# Patient Record
Sex: FEMALE | Race: WHITE | NOT HISPANIC OR LATINO | Employment: UNEMPLOYED | URBAN - METROPOLITAN AREA
[De-identification: names, ages, dates, MRNs, and addresses within clinical notes are randomized per-mention and may not be internally consistent; named-entity substitution may affect disease eponyms.]

---

## 2019-09-03 DIAGNOSIS — Z00.00 ROUTINE GENERAL MEDICAL EXAMINATION AT A HEALTH CARE FACILITY: Primary | ICD-10-CM

## 2019-09-24 ENCOUNTER — OFFICE VISIT (OUTPATIENT)
Dept: OBSTETRICS AND GYNECOLOGY | Facility: CLINIC | Age: 54
End: 2019-09-24
Payer: COMMERCIAL

## 2019-09-24 ENCOUNTER — HOSPITAL ENCOUNTER (OUTPATIENT)
Dept: RADIOLOGY | Facility: HOSPITAL | Age: 54
Discharge: HOME | End: 2019-09-24
Attending: FAMILY MEDICINE
Payer: COMMERCIAL

## 2019-09-24 ENCOUNTER — OFFICE VISIT (OUTPATIENT)
Dept: INTERNAL MEDICINE | Facility: CLINIC | Age: 54
End: 2019-09-24
Payer: COMMERCIAL

## 2019-09-24 ENCOUNTER — CLINICAL SUPPORT (OUTPATIENT)
Dept: INTERNAL MEDICINE | Facility: CLINIC | Age: 54
End: 2019-09-24
Payer: COMMERCIAL

## 2019-09-24 ENCOUNTER — CLINICAL SUPPORT (OUTPATIENT)
Dept: CARDIOLOGY | Facility: CLINIC | Age: 54
End: 2019-09-24
Payer: COMMERCIAL

## 2019-09-24 VITALS
SYSTOLIC BLOOD PRESSURE: 98 MMHG | HEIGHT: 62 IN | DIASTOLIC BLOOD PRESSURE: 76 MMHG | BODY MASS INDEX: 22.11 KG/M2 | WEIGHT: 120.13 LBS

## 2019-09-24 VITALS — BODY MASS INDEX: 22.11 KG/M2 | HEIGHT: 62 IN | WEIGHT: 120.13 LBS

## 2019-09-24 VITALS
TEMPERATURE: 97 F | HEART RATE: 62 BPM | DIASTOLIC BLOOD PRESSURE: 73 MMHG | BODY MASS INDEX: 21.91 KG/M2 | HEIGHT: 62 IN | RESPIRATION RATE: 14 BRPM | WEIGHT: 119.06 LBS | SYSTOLIC BLOOD PRESSURE: 105 MMHG

## 2019-09-24 DIAGNOSIS — E55.9 VITAMIN D DEFICIENCY: ICD-10-CM

## 2019-09-24 DIAGNOSIS — Z00.00 ROUTINE GENERAL MEDICAL EXAMINATION AT A HEALTH CARE FACILITY: ICD-10-CM

## 2019-09-24 DIAGNOSIS — Z01.419 ENCOUNTER FOR GYNECOLOGICAL EXAMINATION WITHOUT ABNORMAL FINDING: Primary | ICD-10-CM

## 2019-09-24 DIAGNOSIS — Z12.11 COLON CANCER SCREENING: ICD-10-CM

## 2019-09-24 DIAGNOSIS — Z00.00 ENCOUNTER FOR WELLNESS EXAMINATION: Primary | ICD-10-CM

## 2019-09-24 DIAGNOSIS — Z00.00 ROUTINE GENERAL MEDICAL EXAMINATION AT A HEALTH CARE FACILITY: Primary | ICD-10-CM

## 2019-09-24 DIAGNOSIS — L98.9 LESION OF SKIN OF FACE: ICD-10-CM

## 2019-09-24 LAB
ALBUMIN SERPL BCP-MCNC: 4 G/DL (ref 3.5–5.2)
ALP SERPL-CCNC: 57 U/L (ref 55–135)
ALT SERPL W/O P-5'-P-CCNC: 19 U/L (ref 10–44)
ANION GAP SERPL CALC-SCNC: 9 MMOL/L (ref 8–16)
AST SERPL-CCNC: 16 U/L (ref 10–40)
BILIRUB SERPL-MCNC: 0.6 MG/DL (ref 0.1–1)
BUN SERPL-MCNC: 14 MG/DL (ref 6–20)
CALCIUM SERPL-MCNC: 9.6 MG/DL (ref 8.7–10.5)
CHLORIDE SERPL-SCNC: 106 MMOL/L (ref 95–110)
CHOLEST SERPL-MCNC: 224 MG/DL (ref 120–199)
CHOLEST/HDLC SERPL: 3.6 {RATIO} (ref 2–5)
CO2 SERPL-SCNC: 26 MMOL/L (ref 23–29)
CREAT SERPL-MCNC: 0.7 MG/DL (ref 0.5–1.4)
ERYTHROCYTE [DISTWIDTH] IN BLOOD BY AUTOMATED COUNT: 12.4 % (ref 11.5–14.5)
EST. GFR  (AFRICAN AMERICAN): >60 ML/MIN/1.73 M^2
EST. GFR  (NON AFRICAN AMERICAN): >60 ML/MIN/1.73 M^2
GLUCOSE SERPL-MCNC: 79 MG/DL (ref 70–110)
HCT VFR BLD AUTO: 41 % (ref 37–48.5)
HDLC SERPL-MCNC: 62 MG/DL (ref 40–75)
HDLC SERPL: 27.7 % (ref 20–50)
HGB BLD-MCNC: 14.2 G/DL (ref 12–16)
LDLC SERPL CALC-MCNC: 146.2 MG/DL (ref 63–159)
MCH RBC QN AUTO: 33.2 PG (ref 27–31)
MCHC RBC AUTO-ENTMCNC: 34.6 G/DL (ref 32–36)
MCV RBC AUTO: 96 FL (ref 82–98)
NONHDLC SERPL-MCNC: 162 MG/DL
PLATELET # BLD AUTO: 264 K/UL (ref 150–350)
PMV BLD AUTO: 9.6 FL (ref 9.2–12.9)
POTASSIUM SERPL-SCNC: 3.8 MMOL/L (ref 3.5–5.1)
PROT SERPL-MCNC: 6.9 G/DL (ref 6–8.4)
RBC # BLD AUTO: 4.28 M/UL (ref 4–5.4)
SODIUM SERPL-SCNC: 141 MMOL/L (ref 136–145)
TRIGL SERPL-MCNC: 79 MG/DL (ref 30–150)
WBC # BLD AUTO: 4.59 K/UL (ref 3.9–12.7)

## 2019-09-24 PROCEDURE — 80061 LIPID PANEL: CPT

## 2019-09-24 PROCEDURE — 99386 PREV VISIT NEW AGE 40-64: CPT | Mod: S$PBB,,, | Performed by: NURSE PRACTITIONER

## 2019-09-24 PROCEDURE — 77067 SCR MAMMO BI INCL CAD: CPT | Mod: 26,,, | Performed by: RADIOLOGY

## 2019-09-24 PROCEDURE — 99999 PR PBB SHADOW E&M-EST. PATIENT-LVL II: ICD-10-PCS | Mod: PBBFAC,,, | Performed by: NURSE PRACTITIONER

## 2019-09-24 PROCEDURE — 71046 X-RAY EXAM CHEST 2 VIEWS: CPT | Mod: TC

## 2019-09-24 PROCEDURE — 71046 X-RAY EXAM CHEST 2 VIEWS: CPT | Mod: 26,,, | Performed by: RADIOLOGY

## 2019-09-24 PROCEDURE — 77063 MAMMO DIGITAL SCREENING BILAT WITH TOMOSYNTHESIS_CAD: ICD-10-PCS | Mod: 26,,, | Performed by: RADIOLOGY

## 2019-09-24 PROCEDURE — 77067 MAMMO DIGITAL SCREENING BILAT WITH TOMOSYNTHESIS_CAD: ICD-10-PCS | Mod: 26,,, | Performed by: RADIOLOGY

## 2019-09-24 PROCEDURE — 93010 ELECTROCARDIOGRAM REPORT: CPT | Mod: S$PBB,,, | Performed by: INTERNAL MEDICINE

## 2019-09-24 PROCEDURE — 99999 PR PBB SHADOW E&M-EST. PATIENT-LVL III: ICD-10-PCS | Mod: PBBFAC,,, | Performed by: FAMILY MEDICINE

## 2019-09-24 PROCEDURE — 71046 XR CHEST PA AND LATERAL: ICD-10-PCS | Mod: 26,,, | Performed by: RADIOLOGY

## 2019-09-24 PROCEDURE — 99999 PR PBB SHADOW E&M-EST. PATIENT-LVL II: CPT | Mod: PBBFAC,,, | Performed by: NURSE PRACTITIONER

## 2019-09-24 PROCEDURE — 99396 PREV VISIT EST AGE 40-64: CPT | Mod: S$GLB,,, | Performed by: FAMILY MEDICINE

## 2019-09-24 PROCEDURE — 93010 EKG 12-LEAD: ICD-10-PCS | Mod: S$PBB,,, | Performed by: INTERNAL MEDICINE

## 2019-09-24 PROCEDURE — 77063 BREAST TOMOSYNTHESIS BI: CPT | Mod: 26,,, | Performed by: RADIOLOGY

## 2019-09-24 PROCEDURE — 88175 CYTOPATH C/V AUTO FLUID REDO: CPT

## 2019-09-24 PROCEDURE — 99999 PR PBB SHADOW E&M-EST. PATIENT-LVL III: CPT | Mod: PBBFAC,,, | Performed by: FAMILY MEDICINE

## 2019-09-24 PROCEDURE — 85027 COMPLETE CBC AUTOMATED: CPT

## 2019-09-24 PROCEDURE — 99396 PR PREVENTIVE VISIT,EST,40-64: ICD-10-PCS | Mod: S$GLB,,, | Performed by: FAMILY MEDICINE

## 2019-09-24 PROCEDURE — 77067 SCR MAMMO BI INCL CAD: CPT | Mod: TC

## 2019-09-24 PROCEDURE — 80053 COMPREHEN METABOLIC PANEL: CPT

## 2019-09-24 PROCEDURE — 93005 ELECTROCARDIOGRAM TRACING: CPT | Mod: PBBFAC | Performed by: INTERNAL MEDICINE

## 2019-09-24 PROCEDURE — 99386 PR PREVENTIVE VISIT,NEW,40-64: ICD-10-PCS | Mod: S$PBB,,, | Performed by: NURSE PRACTITIONER

## 2019-09-24 NOTE — PATIENT INSTRUCTIONS
The Range of Pap Test Results  When your Pap test is sent to the lab, the lab studies your cell samples and reports any abnormal cell changes. Your health care provider can discuss these changes with you. In some cases, an abnormal Pap test is due to an infection. More serious cell changes range from dysplasia to cancer. Talk to your health care provider about your Pap test.    Normal results  Cervical cells, even normal ones, are always changing. As they mature, normal squamous cells move from deeper layers within the cervix. Over time, these cells flatten and cover the surface of the cervix. Within the cervical canal, the cells are different. These glandular cells are taller and not as flat as the cells on the surface of the cervix. When a Pap test sample shows healthy cells of both types, the results are negative. Keep having Pap tests as often as directed.  Abnormal results  A positive Pap test result means some cells in the sample showed abnormal changes. These results are grouped by the type of cell change and the location, or extent, of the changes. Depending on the results, you may need further testing.  · Inflammation: Noncancerous changes are present. They may be due to normal cell repair. Or, they may be caused by an infection, such as HPV or yeast. Further testing may be needed. (Also called reactive cellular changes.)  · Atypical squamous cells: Test results are unclear. Cells on the surface of the cervix show changes, but their significance is not yet known. Testing for HPV and other sexually transmitted infections (STIs) may be needed. Treatment may be required. (Reported as ASC-US or ASC-H.)  · Atypical glandular cells: Cells lining the cervical canal show abnormal changes. Further testing is likely. You may also have treatment to destroy or remove problem cells. (Reported as AGC.)  · Mild dysplasia: Cells show distinct changes. More testing or HPV typing may be done. You may also have treatment to  destroy or remove problem cells. (Reported as low-grade SUKH or ALOK 1.)  · Moderate to severe dysplasia: Cells show precancerous changes. Or, noninvasive cancer (carcinoma in situ) may be present. Treatment to destroy or remove problem cells is likely. (Reported as high-grade SUKH or ALOK 2 or ALOK 3.)  · Cancer: Different types of cancer may be detected by your Pap test. More tests to assess the cancer's extent are likely. The type of treatment will depend on the test results and other factors, such as age and health history. (Reported as squamous cell carcinoma, endocervical adenocarcinoma in situ, or adenocarcinoma.)  Date Last Reviewed: 5/12/2015  © 5354-5042 [employer]. [address] [place] [address], [place] [address]. All rights reserved. This information is not intended as a substitute for professional medical care. Always follow your healthcare professional's instructions.        Breast Health: Breast Self-Awareness  What is breast self-awareness?  Breast self-awareness is knowing how your breasts normally look and feel. Your breasts change as you go through different stages of your life. So its important to learn what is normal for your breasts. Breast self-awareness helps you notice any changes in your breasts right away. Report any changes to your healthcare provider.  Why is breast self-awareness important?  Many experts now say that women should focus on breast self-awareness instead of doing a breast self-examination (BSE). These experts include the American Cancer Society, the U.S. Preventive Services Task Force, and the American Congress of Obstetricians and Gynecologists. Some experts even advise not teaching women to do a BSE. Thats because research hasnt shown a clear benefit to doing BSEs.  Breast self-awareness is different than a BSE. Breast self-awareness isnt about following a certain method and schedule. Its about knowing what's normal for your breasts. That way you can notice even  small changes right away. If you see any changes, report them to your healthcare provider.  Changes to look for  Call your healthcare provider if you find any changes in your breasts that concern you. These changes may include:  · A lump  · Nipple discharge other than breast milk, especially a bloody discharge  · Swelling  · A change in size or shape  · Skin irritation, such as redness, thickening, or dimpling of the skin  · Swollen lymph nodes in the armpit  · Nipple problems, such as pain or redness  If you find a lump  Contact your provider if you find lumpiness in one breast, feel something different in the tissue, or feel a definite lump. Sometimes lumpiness may be due to menstrual changes. But there may be reason for concern.  Your provider may want to see you right away if you have:  · Nipple discharge that is bloody  · Skin changes on your breast, such as dimpling or puckering  Its normal to be upset if you find a lump. But its important to contact your provider right away. Remember that most breast lumps are benign. This means they are not cancer.  Date Last Reviewed: 8/10/2015  © 6893-5758 [employer]. [address] [place] [address], [place] [address]. All rights reserved. This information is not intended as a substitute for professional medical care. Always follow your healthcare professional's instructions.

## 2019-09-24 NOTE — PATIENT INSTRUCTIONS
General Guidelines for Healthy Bones    Eat a healthy diet.     Exercise regularly.     Do not smoke.     Avoid excess alcohol and caffeine.     Practice fall prevention.   Eat a Healthy Diet   Good nutrition is essential for normal growth. A balanced diet, adequate calories, and appropriate nutrients are the foundation for development of all your tissues, including bone. Inadequate intake of calcium and vitamin D is thought to contribute to the development and progression of osteoporosis.   Calcium   Calcium is the nutrient most important for attaining peak bone mass and for preventing and treating osteoporosis. Depending on your age, recommended calcium intakes for adults fall between 1,000 and 1,200 mg per day. The majority of children, adolescents, and adults do not meet adequate calcium requirements.    You can increase your calcium intake by eating more calcium-rich foods such as low-fat milk, yogurt, cheese, sardines, soy products, and brit greens. Many foods such as orange juice, breakfast bars, and cereals are now fortified with calcium. If you are unable to get adequate calcium in your diet, talk to your healthcare provider or a registered dietitian (RD) about a supplement.    High dietary protein, caffeine, and sodium can adversely affect calcium balance, but the effects may not be as important in individuals with adequate calcium intakes.  Vitamin D   Vitamin D is required for optimal calcium absorption and bone health. Your skin manufactures vitamin D in response to direct exposure to sunlight. How much sunlight you need depends on where you live, the time of day, and the color of your skin.    Older adults and people who have little exposure to sunlight may have difficulty meeting vitamin D requirements. Most infants and young children in the [place] have adequate vitamin D intake because of the fortification of milk. During adolescence, when consumption of dairy products decreases,  vitamin D intake may be inadequate, and this may adversely affect calcium absorption.    Experts recommend a daily intake of between 600 and 1,000 international units (IU) of vitamin D.  Exercise Regularly   There is strong evidence that regular physical activity in childhood and adolescence contributes to higher peak bone mass. Exercise during later years, combined with adequate calcium and vitamin D intake, may help slow the decline in bone density associated with aging. Some evidence indicates that weight-bearing and resistance exercises are most likely to be beneficial.    To help reduce bone loss, engage in regular weight-bearing exercise. Examples include walking, running, tennis, dancing, hiking, and racquetball. Strength training can also help increase your bone density and keep your muscles strong, so that you are less likely to fall.  Do Not Smoke   Smokers have a higher risk of osteoporosis. A study reported in the Journal of the American Academy of Orthopedics suggests that smoking puts you at higher risk for developing osteoporosis.   Avoid Excessive Alcohol and Caffeine   Alcohol has several negative affects on bones. Alcohol can interfere with vitamin D metabolism, which results in impaired calcium absorption. It also increases magnesium excretion. In addition, heavy drinkers tend to have diets that are lacking in many key nutrients, including calcium and vitamin D.    Heavy drinking can affect hormone production in both women and men. In premenopausal women, chronic alcohol use can result in irregular menstrual cycles. This increases the risk of osteoporosis. Testosterone production may be affected in alcoholic men. Low testosterone levels have been linked to a decrease in bone formation.    Heavy alcohol consumption is associated with an increased risk of fracture. Alcohol affects your balance and gait, making you more prone to stumble, trip, or run into objects.    The relationship between caffeine  use and bone health is not as clear-cut. Studies on the effect of caffeine on the bones have yielded mixed results. On the basis of the studies to date, current recommendations are for moderate caffeine consumption, which equals two or less cups of coffee per day.  Practice Fall Prevention   Fall prevention is always important, but especially for people with osteoporosis. Osteoporotic bones are fragile and, therefore, more easily broken. Most falls happen in the home. The National Osteoporosis Foundation makes the following recommendations to help make your home safe:   Floors-Remove all loose wires, cords, and throw rugs. Minimize clutter. Make sure rugs are anchored and smooth. Keep furniture in its accustomed place.   Bathrooms-Install grab bars and non-skid tape in the tub or shower.   Lighting-Make sure halls, stairways, and entrances are well lit. Install a night light in your bathroom. Turn lights on if you get up in the middle of the night.   Kitchen-Install non-skid rubber mats near sink and stove. Clean spills immediately.   Stairs-Make sure treads, rails, and rugs are secure.   Other precautions-Wear sturdy, rubber-soled shoes. Keep your intake of alcoholic beverages to a minimum. Ask your doctor whether any of your medications might cause you to fall.

## 2019-09-24 NOTE — PROGRESS NOTES
Subjective:       Patient ID: Jessica Walker is a 54 y.o. female.    Chief Complaint: Executive Health    55 yo female here for executive health physical. +h/o otosclerosis with stapesectomy--looking for an otologist. She has noticed a red spot on her face.     +FH breast cancer in mid-70s    Pap: s/p hysterectomy  Cscope: none recent  MMG: today  Flu: declines  Tdap: believes about 10 years ago      does not have a problem list on file.  No past medical history on file.  Past Surgical History:   Procedure Laterality Date    HYSTERECTOMY  2009    large fibroid, retains ovaries     INNER EAR SURGERY Bilateral      Family History   Problem Relation Age of Onset    Heart attack Father     Breast cancer Mother      Social History     Socioeconomic History    Marital status:      Spouse name: Not on file    Number of children: Not on file    Years of education: Not on file    Highest education level: Not on file   Occupational History    Not on file   Social Needs    Financial resource strain: Not on file    Food insecurity:     Worry: Not on file     Inability: Not on file    Transportation needs:     Medical: Not on file     Non-medical: Not on file   Tobacco Use    Smoking status: Never Smoker    Smokeless tobacco: Never Used   Substance and Sexual Activity    Alcohol use: Not Currently    Drug use: Never    Sexual activity: Yes     Partners: Male   Lifestyle    Physical activity:     Days per week: Not on file     Minutes per session: Not on file    Stress: Not on file   Relationships    Social connections:     Talks on phone: Not on file     Gets together: Not on file     Attends Scientologist service: Not on file     Active member of club or organization: Not on file     Attends meetings of clubs or organizations: Not on file     Relationship status: Not on file   Other Topics Concern    Not on file   Social History Narrative    Not on file     Review of Systems   Constitutional: Negative for  fatigue and unexpected weight change.   HENT: Positive for hearing loss. Negative for sore throat.    Eyes: Negative for pain and visual disturbance.   Respiratory: Negative for cough and shortness of breath.    Cardiovascular: Negative for chest pain and palpitations.   Gastrointestinal: Negative for abdominal pain, constipation and diarrhea.   Genitourinary: Negative for difficulty urinating, dysuria and vaginal discharge.   Musculoskeletal: Negative for arthralgias and myalgias.   Skin: Negative for rash and wound.   Neurological: Negative for light-headedness and headaches.   Hematological: Negative.        Objective:     Vitals:    09/24/19 1058   BP: 105/73   Pulse: 62   Resp: 14   Temp: 96.8 °F (36 °C)        Physical Exam   Constitutional: She is oriented to person, place, and time. She appears well-developed and well-nourished.   HENT:   Head: Normocephalic and atraumatic.   Eyes: Pupils are equal, round, and reactive to light. EOM are normal.   Neck: Normal range of motion. Neck supple.   Cardiovascular: Normal rate and regular rhythm.   Pulmonary/Chest: Effort normal and breath sounds normal.   Musculoskeletal: Normal range of motion. She exhibits no deformity.   Neurological: She is alert and oriented to person, place, and time.   Skin: Skin is warm and dry.   Psychiatric: She has a normal mood and affect. Her behavior is normal.   Nursing note and vitals reviewed.      Assessment:       1. Encounter for wellness examination    2. Colon cancer screening    3. Lesion of skin of face    4. Vitamin D deficiency        Plan:           Problem List Items Addressed This Visit     None      Visit Diagnoses     Encounter for wellness examination    -  Primary    Colon cancer screening        Relevant Orders    Case request GI: COLONOSCOPY (Completed)    Lesion of skin of face        Relevant Orders    Ambulatory Referral to Dermatology    Vitamin D deficiency        Relevant Orders    Vitamin D

## 2019-09-24 NOTE — PROGRESS NOTES
"CC: Well woman exam    Jessica Walker is a 54 y.o. female No obstetric history on file. presents for a well woman exam.  No issues, problems, or complaints.    MMG and pcp appt today.     History reviewed. No pertinent past medical history.  Past Surgical History:   Procedure Laterality Date    HYSTERECTOMY  2009    large fibroid, retains ovaries     INNER EAR SURGERY Bilateral      Family History   Problem Relation Age of Onset    Heart attack Father     Breast cancer Mother      Social History     Tobacco Use    Smoking status: Never Smoker    Smokeless tobacco: Never Used   Substance Use Topics    Alcohol use: Not Currently    Drug use: Never     OB History    None         BP 98/76   Ht 5' 2" (1.575 m)   Wt 54.5 kg (120 lb 2.4 oz)   BMI 21.98 kg/m²     ROS:  GENERAL: Denies weight gain or weight loss. Feeling well overall.   SKIN: Denies rash or lesions.   HEAD: Denies head injury or headache.   NODES: Denies enlarged lymph nodes.   CHEST: Denies chest pain or shortness of breath.   CARDIOVASCULAR: Denies palpitations or left sided chest pain.   ABDOMEN: No abdominal pain, constipation, diarrhea, nausea, vomiting or rectal bleeding.   URINARY: No frequency, dysuria, hematuria, or burning on urination.  REPRODUCTIVE: See HPI.   BREASTS: The patient performs breast self-examination and denies pain, lumps, or nipple discharge.   HEMATOLOGIC: No easy bruisability or excessive bleeding.   MUSCULOSKELETAL: Denies joint pain or swelling.   NEUROLOGIC: Denies syncope or weakness.   PSYCHIATRIC: Denies depression, anxiety or mood swings.    PE:   APPEARANCE: Well nourished, well developed, in no acute distress.  AFFECT: WNL, alert and oriented x 3.  SKIN: No acne or hirsutism.  NECK: Neck symmetric without masses or thyromegaly.  NODES: No inguinal, cervical, axillary or femoral lymph node enlargement.  CHEST: Good respiratory effort.   ABDOMEN: Soft. No tenderness or masses. No hepatosplenomegaly. No " hernias.  BREASTS: Symmetrical, no skin changes or visible lesions. No palpable masses, nipple discharge bilaterally.  PELVIC: Normal external female genitalia without lesions. Normal hair distribution. Adequate perineal body, normal urethral meatus. Vagina atrophic without lesions or discharge. No significant cystocele or rectocele. Bimanual exam shows uterus and cervix to be surgically absent - mild prolapse of midline vaginal wall - pap taken of cuff. Adnexa without masses or tenderness.  .    1. Encounter for gynecological examination without abnormal finding  Liquid-based pap smear, screening    and PLAN:    Patient was counseled today on A.C.S. Pap guidelines and recommendations for yearly pelvic exams, mammograms and monthly self breast exams; to see her PCP for other health maintenance.

## 2019-09-25 ENCOUNTER — PATIENT MESSAGE (OUTPATIENT)
Dept: INTERNAL MEDICINE | Facility: CLINIC | Age: 54
End: 2019-09-25

## 2019-09-26 ENCOUNTER — TELEPHONE (OUTPATIENT)
Dept: ENDOSCOPY | Facility: HOSPITAL | Age: 54
End: 2019-09-26

## 2019-09-26 NOTE — TELEPHONE ENCOUNTER
Called pt to schedule her colonoscopy. She refused at this time and will call back once she looks at her schedule.

## 2019-10-01 ENCOUNTER — PATIENT MESSAGE (OUTPATIENT)
Dept: OBSTETRICS AND GYNECOLOGY | Facility: CLINIC | Age: 54
End: 2019-10-01

## 2019-10-01 ENCOUNTER — INITIAL CONSULT (OUTPATIENT)
Dept: DERMATOLOGY | Facility: CLINIC | Age: 54
End: 2019-10-01
Payer: COMMERCIAL

## 2019-10-01 DIAGNOSIS — D22.9 MULTIPLE BENIGN NEVI: ICD-10-CM

## 2019-10-01 DIAGNOSIS — L72.0 MILIA: ICD-10-CM

## 2019-10-01 DIAGNOSIS — L81.4 LENTIGO: ICD-10-CM

## 2019-10-01 DIAGNOSIS — D18.00 ANGIOMA: Primary | ICD-10-CM

## 2019-10-01 PROCEDURE — 99999 PR PBB SHADOW E&M-EST. PATIENT-LVL II: ICD-10-PCS | Mod: PBBFAC,,, | Performed by: STUDENT IN AN ORGANIZED HEALTH CARE EDUCATION/TRAINING PROGRAM

## 2019-10-01 PROCEDURE — 99202 PR OFFICE/OUTPT VISIT, NEW, LEVL II, 15-29 MIN: ICD-10-PCS | Mod: S$GLB,,, | Performed by: STUDENT IN AN ORGANIZED HEALTH CARE EDUCATION/TRAINING PROGRAM

## 2019-10-01 PROCEDURE — 99999 PR PBB SHADOW E&M-EST. PATIENT-LVL II: CPT | Mod: PBBFAC,,, | Performed by: STUDENT IN AN ORGANIZED HEALTH CARE EDUCATION/TRAINING PROGRAM

## 2019-10-01 PROCEDURE — 99202 OFFICE O/P NEW SF 15 MIN: CPT | Mod: S$GLB,,, | Performed by: STUDENT IN AN ORGANIZED HEALTH CARE EDUCATION/TRAINING PROGRAM

## 2019-10-01 NOTE — LETTER
October 1, 2019      Radha Garcia MD  [address] [facility] [place] [address]           [facility]  [address] [facility] [place] [address]  Phone: [phone]  Fax: [phone]          Patient: Jessica Walker   MR Number: [id number]   YOB: 1965   Date of Visit: 10/1/2019       Dear Dr. Radha Garcia:    Thank you for referring Jessica Walker to me for evaluation. Attached you will find relevant portions of my assessment and plan of care.    If you have questions, please do not hesitate to call me. I look forward to following Jessica Walker along with you.    Sincerely,    Herrrea Roe MD    Enclosure  CC:  No Recipients    If you would like to receive this communication electronically, please contact [email or web address] or [phone] to request more information on [employer] Link access.    For providers and/or their staff who would like to refer a patient to [facility], please contact us through our one-stop-shop provider referral line, [facility], at [phone].    If you feel you have received this communication in error or would no longer like to receive these types of communications, please e-mail [email or web address]

## 2019-10-01 NOTE — PROGRESS NOTES
Subjective:       Patient ID:  Jessica Walker is a 54 y.o. female who presents for   Chief Complaint   Patient presents with    Lesion     right cheek been here for 8 months not using anything     Skin Tags     right axillary been here for 6 months not using naything      HPI    Review of Systems     Objective:    Physical Exam   Constitutional: She appears well-developed and well-nourished. No distress.   Neurological: She is alert and oriented to person, place, and time. She is not disoriented.   Psychiatric: She has a normal mood and affect.   Skin:   Areas Examined (abnormalities noted in diagram):   Head / Face Inspection Performed  Neck Inspection Performed  Chest / Axilla Inspection Performed  Abdomen Inspection Performed  Back Inspection Performed  RUE Inspected  LUE Inspection Performed                       Diagram Legend     Erythematous scaling macule/papule c/w actinic keratosis       Vascular papule c/w angioma      Pigmented verrucoid papule/plaque c/w seborrheic keratosis      Yellow umbilicated papule c/w sebaceous hyperplasia      Irregularly shaped tan macule c/w lentigo     1-2 mm smooth white papules consistent with Milia      Movable subcutaneous cyst with punctum c/w epidermal inclusion cyst      Subcutaneous movable cyst c/w pilar cyst      Firm pink to brown papule c/w dermatofibroma      Pedunculated fleshy papule(s) c/w skin tag(s)      Evenly pigmented macule c/w junctional nevus     Mildly variegated pigmented, slightly irregular-bordered macule c/w mildly atypical nevus      Flesh colored to evenly pigmented papule c/w intradermal nevus       Pink pearly papule/plaque c/w basal cell carcinoma      Erythematous hyperkeratotic cursted plaque c/w SCC      Surgical scar with no sign of skin cancer recurrence      Open and closed comedones      Inflammatory papules and pustules      Verrucoid papule consistent consistent with wart     Erythematous eczematous patches and plaques      Dystrophic onycholytic nail with subungual debris c/w onychomycosis     Umbilicated papule    Erythematous-base heme-crusted tan verrucoid plaque consistent with inflamed seborrheic keratosis     Erythematous Silvery Scaling Plaque c/w Psoriasis     See annotation      Assessment / Plan:        Angioma  This is a benign vascular lesion. Reassurance given. No treatment required.     Multiple benign nevi  upper body skin examination performed today including at least 6 points as noted in physical examination. No lesions suspicious for malignancy noted.  Reassurance provided.  Instructed patient to observe lesion(s) for changes and follow up in clinic if changes are noted. Discussed ABCDE's of moles and brochure provided.    Milia  Reassurance given to patient. No treatment is necessary.     Lentigo  This is a benign hyperpigmented sun induced lesion. Daily sun protection will reduce the number of new lesions. Treatment of these benign lesions are considered cosmetic.             Follow up in about 1 year (around 10/1/2020).

## 2019-10-10 ENCOUNTER — PATIENT MESSAGE (OUTPATIENT)
Dept: INTERNAL MEDICINE | Facility: CLINIC | Age: 54
End: 2019-10-10

## 2019-10-10 ENCOUNTER — PATIENT MESSAGE (OUTPATIENT)
Dept: OBSTETRICS AND GYNECOLOGY | Facility: CLINIC | Age: 54
End: 2019-10-10

## 2019-10-24 DIAGNOSIS — Z91.89 INCREASED RISK OF BREAST CANCER: Primary | ICD-10-CM

## 2019-11-01 ENCOUNTER — PATIENT MESSAGE (OUTPATIENT)
Dept: SURGERY | Facility: CLINIC | Age: 54
End: 2019-11-01

## 2019-11-11 ENCOUNTER — PATIENT MESSAGE (OUTPATIENT)
Dept: INTERNAL MEDICINE | Facility: CLINIC | Age: 54
End: 2019-11-11

## 2019-12-02 ENCOUNTER — HOSPITAL ENCOUNTER (OUTPATIENT)
Dept: RADIOLOGY | Facility: HOSPITAL | Age: 54
Discharge: HOME | End: 2019-12-02
Attending: NURSE PRACTITIONER
Payer: COMMERCIAL

## 2019-12-02 ENCOUNTER — OFFICE VISIT (OUTPATIENT)
Dept: SURGERY | Facility: CLINIC | Age: 54
End: 2019-12-02
Payer: COMMERCIAL

## 2019-12-02 VITALS
BODY MASS INDEX: 23.27 KG/M2 | DIASTOLIC BLOOD PRESSURE: 68 MMHG | WEIGHT: 127.19 LBS | HEART RATE: 68 BPM | SYSTOLIC BLOOD PRESSURE: 107 MMHG | TEMPERATURE: 98 F

## 2019-12-02 DIAGNOSIS — G89.29 CHRONIC HEEL PAIN, LEFT: ICD-10-CM

## 2019-12-02 DIAGNOSIS — Z80.3 FAMILY HISTORY OF BREAST CANCER: ICD-10-CM

## 2019-12-02 DIAGNOSIS — M79.672 CHRONIC HEEL PAIN, LEFT: ICD-10-CM

## 2019-12-02 DIAGNOSIS — Z71.89 COUNSELING ON HEALTH PROMOTION AND DISEASE PREVENTION: ICD-10-CM

## 2019-12-02 DIAGNOSIS — Z12.11 COLON CANCER SCREENING: ICD-10-CM

## 2019-12-02 DIAGNOSIS — Z71.89 COUNSELING AND COORDINATION OF CARE: ICD-10-CM

## 2019-12-02 DIAGNOSIS — Z91.89 AT HIGH RISK FOR BREAST CANCER: Primary | ICD-10-CM

## 2019-12-02 DIAGNOSIS — Z80.3 FAMILY HISTORY OF BREAST CANCER IN MOTHER: ICD-10-CM

## 2019-12-02 PROCEDURE — 73630 X-RAY EXAM OF FOOT: CPT | Mod: TC,LT

## 2019-12-02 PROCEDURE — 99999 PR PBB SHADOW E&M-EST. PATIENT-LVL III: ICD-10-PCS | Mod: PBBFAC,,, | Performed by: NURSE PRACTITIONER

## 2019-12-02 PROCEDURE — 73630 X-RAY EXAM OF FOOT: CPT | Mod: 26,LT,, | Performed by: RADIOLOGY

## 2019-12-02 PROCEDURE — 99205 PR OFFICE/OUTPT VISIT, NEW, LEVL V, 60-74 MIN: ICD-10-PCS | Mod: S$GLB,,, | Performed by: NURSE PRACTITIONER

## 2019-12-02 PROCEDURE — 73630 XR FOOT COMPLETE 3 VIEW LEFT: ICD-10-PCS | Mod: 26,LT,, | Performed by: RADIOLOGY

## 2019-12-02 PROCEDURE — 99205 OFFICE O/P NEW HI 60 MIN: CPT | Mod: S$GLB,,, | Performed by: NURSE PRACTITIONER

## 2019-12-02 PROCEDURE — 99999 PR PBB SHADOW E&M-EST. PATIENT-LVL III: CPT | Mod: PBBFAC,,, | Performed by: NURSE PRACTITIONER

## 2019-12-02 RX ORDER — DIAZEPAM 5 MG/1
5 TABLET ORAL EVERY 6 HOURS PRN
COMMUNITY
End: 2020-03-11

## 2019-12-02 RX ORDER — MAGNESIUM 30 MG
TABLET ORAL ONCE
COMMUNITY

## 2019-12-02 NOTE — LETTER
December 2, 2019      Radha Garcia MD  [address]  [place] [address]           [facility]  [address] [place] [address]  Phone: [phone]  Fax: [phone]          Patient: Jessica Walker   MR Number: [id number]   YOB: 1965   Date of Visit: 12/2/2019       Dear Dr. Radha Garcia:    Thank you for referring Jessica Walker to me for evaluation. Attached you will find relevant portions of my assessment and plan of care.    If you have questions, please do not hesitate to call me. I look forward to following Jessica Walker along with you.    Sincerely,    Silvana Mcintosh NP    Enclosure  CC:  No Recipients    If you would like to receive this communication electronically, please contact [email or web address] or [phone] to request more information on [employer] Link access.    For providers and/or their staff who would like to refer a patient to [facility], please contact us through our one-stop-shop provider referral line, [facility], at [phone].    If you feel you have received this communication in error or would no longer like to receive these types of communications, please e-mail [email or web address]

## 2019-12-02 NOTE — PROGRESS NOTES
Patient ID: Jessica Walker is a 54 y.o. female.    Chief Complaint: High risk for breast cancer      HPI:patient presents for the evaluation of an elevated breast cancer risk assessment score of 20.9%    Risk factors identified:     Menarche at 16 y/o  G  0 P 0  First pregnancy at none  LMP: hyst 43 y/o  Estrogen:none  Radiation to the neck or chest wall- none  Prior breast biopsies or atypical hyperplasia- none      ETOH: none     FH: mother at 74 y/o breast cancer- bilateral- no other cancers- did not do genetic testing     Body mass index is 23.27 kg/m².    Review of Systems   Constitutional: Negative.  Negative for activity change, appetite change, chills, diaphoresis, fatigue, fever and unexpected weight change.   HENT: Negative.  Negative for congestion, ear pain, postnasal drip, rhinorrhea, sinus pressure, sneezing, sore throat and trouble swallowing.    Eyes: Negative.  Negative for visual disturbance.   Respiratory: Negative.  Negative for apnea, cough, chest tightness, shortness of breath and wheezing.    Cardiovascular: Negative.  Negative for chest pain, palpitations and leg swelling.   Gastrointestinal: Negative.  Negative for abdominal distention, abdominal pain, anal bleeding, blood in stool, constipation, diarrhea, nausea and vomiting.        No reflux   Endocrine: Negative.    Genitourinary: Negative.  Negative for decreased urine volume, difficulty urinating, dysuria, frequency, hematuria, pelvic pain and urgency.   Musculoskeletal: Negative.  Negative for arthralgias, back pain, joint swelling and myalgias.        Left foot pain in the heel for about 5 months- occurs in am and if sits a while and tries to walk. Aches at night. Does not recall any injury. No swelling.    Skin: Negative.  Negative for color change and rash.   Allergic/Immunologic: Negative.    Neurological: Negative.  Negative for dizziness, syncope, weakness, light-headedness, numbness and headaches.   Hematological: Negative.   Negative for adenopathy. Does not bruise/bleed easily.   Psychiatric/Behavioral: Negative.  Negative for dysphoric mood and sleep disturbance. The patient is not nervous/anxious.    Breast: Pt denies any breast pain, nipple discharge, or palpable mass. No prior trauma or bruising. No breast surgeries or abnormalities.    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      magnesium 30 mg Tab Take by mouth once.       No current facility-administered medications for this visit.        Review of patient's allergies indicates:   Allergen Reactions    Penicillins        History reviewed. No pertinent past medical history.    Past Surgical History:   Procedure Laterality Date    HYSTERECTOMY  2009    large fibroid, retains ovaries     INNER EAR SURGERY Bilateral        Family History   Problem Relation Age of Onset    Heart attack Father     Breast cancer Mother        Social History     Socioeconomic History    Marital status:      Spouse name: Not on file    Number of children: Not on file    Years of education: Not on file    Highest education level: Not on file   Occupational History    Not on file   Social Needs    Financial resource strain: Not on file    Food insecurity:     Worry: Not on file     Inability: Not on file    Transportation needs:     Medical: Not on file     Non-medical: Not on file   Tobacco Use    Smoking status: Never Smoker    Smokeless tobacco: Never Used   Substance and Sexual Activity    Alcohol use: Not Currently    Drug use: Never    Sexual activity: Yes     Partners: Male   Lifestyle    Physical activity:     Days per week: Not on file     Minutes per session: Not on file    Stress: Not on file   Relationships    Social connections:     Talks on phone: Not on file      Gets together: Not on file     Attends Faith service: Not on file     Active member of club or organization: Not on file     Attends meetings of clubs or organizations: Not on file     Relationship status: Not on file   Other Topics Concern    Not on file   Social History Narrative    Not on file       Vitals:    12/02/19 1309   BP: 107/68   Pulse: 68   Temp: 98 °F (36.7 °C)       Physical Exam   Constitutional: She is oriented to person, place, and time. She appears well-developed and well-nourished.   HENT:   Head: Normocephalic and atraumatic.   Right Ear: External ear normal.   Left Ear: External ear normal.   Mouth/Throat: No oropharyngeal exudate.   Eyes: Pupils are equal, round, and reactive to light. Conjunctivae and EOM are normal. Right eye exhibits no discharge. Left eye exhibits no discharge. No scleral icterus.   Neck: Normal range of motion. Neck supple. No thyromegaly present.   Cardiovascular: Normal rate, regular rhythm and normal heart sounds.   Pulmonary/Chest: Effort normal and breath sounds normal. Right breast exhibits no inverted nipple, no mass, no nipple discharge, no skin change and no tenderness. Left breast exhibits no inverted nipple, no mass, no nipple discharge, no skin change and no tenderness.   Abdominal: Soft. Bowel sounds are normal.   Musculoskeletal: Normal range of motion. She exhibits tenderness (left heel tender to palpation). She exhibits no deformity.        Right shoulder: She exhibits no crepitus and normal strength.   Lymphadenopathy:        Head (right side): No submental, no submandibular, no tonsillar, no preauricular, no posterior auricular and no occipital adenopathy present.        Head (left side): No submental, no submandibular, no tonsillar, no preauricular, no posterior auricular and no occipital adenopathy present.     She has no cervical adenopathy.        Right cervical: No superficial cervical and no posterior cervical adenopathy present.        Left cervical: No superficial cervical and no posterior cervical adenopathy present.     She has no axillary adenopathy.        Right: No supraclavicular adenopathy present.        Left: No supraclavicular adenopathy present.   Neurological: She is alert and oriented to person, place, and time. She has normal reflexes.   Skin: Skin is warm and dry. No rash noted. No erythema. No pallor.   Psychiatric: She has a normal mood and affect. Her behavior is normal. Judgment and thought content normal.     9/24/19  Mammo Digital Screening Bilat w/ Noel  Addendum: Prior images obtained dating back to 2012.  Study remains Bi-RADS  1-Negative.  Narrative: Result:  Mammo Digital Screening Bilat w/ Noel    History:  Patient is 54 y.o. and is seen for a screening mammogram.    Films Compared:  Prior images (if available) were compared.  Patient reportedly has prior   outside exams.  Once these become available, an addendum can be rendered   at that time.    Findings:  Computer-aided detection was utilized in the interpretation of this   examination. This procedure was performed using tomosynthesis.       The breasts are heterogeneously dense, which may obscure small masses.   There is no evidence of suspicious masses, microcalcifications or   architectural distortion.  Impression: No mammographic evidence of malignancy.    BI-RADS Category 1: Negative    Recommendation:  Routine screening mammogram in 1 year is recommended.    The patient's estimated lifetime risk of breast cancer (to age 85) based   on Tyrer-Cuzick - 7 risk assessment model is: Tyrer-Cuzick: 20.9 %.   According to the American Cancer Society,  patients with a lifetime breast   cancer risk of 20% or higher might benefit from supplemental screening   tests.       Assessment & Plan:  At high risk for breast cancer    Family history of breast cancer    Counseling and coordination of care    Counseling on health promotion and disease prevention    Colon cancer  screening  -     Case request GI: COLONOSCOPY    Chronic heel pain, left  -     X-Ray Foot Complete 3 view Left; Future; Expected date: 12/02/2019    Family history of breast cancer in mother      1. Negative clinical findings on exam.  2. Negative imaging- elevated risk for breast cancer  3. Explained results of risk assessment and recommendations for additional screening with MRI in 6 months alternating with Diagnostic mammograms. Pt not a candidate for MRI due to stainless dotson tubes in ears.   4. Next imaging due: Tano mammo Sept 2020  5. Discussed modifiable risk factors such as diet and exercise. Reviewed diet and counseled pt regarding eating more fruits and vegetables throughout the day.  6. Diet modifications:none- pt doing well  7. Diet/Weight goals- maintain healthy wt and exercise regimen  8. Exercise:walks every day- 30 minutes - couple of times a day- boot camp every morning for 30 minutes 6 days a week  9. Encouraged 30 minutes most days of the week. Explained benefits of losing a few pounds to decrease estrogen exposure from fat cells.  10. Discussed risks vs benefits of genetic testing.  Indications for testing: due to her family history of breast cancer. Explained process of drawing blood- filing with insurance- if denied pt will be notified and given the option of paying out of pocket.   11. Explained 3 possible answers to genetic testing.   - Negative - if testing is negative would proceed with MRI and Mammogram screenings alternating every 6 months with clinical exams.  - Positive - If positive for a mutation- depending on the gene identified - would likely be recommended to see surgeon and plastic surgeon to discuss risk reducing prophylactic tano mastectomies with reconstruction with or without TAHBSO depending on test results.  Indication for testing of other relatives explained.  - Variant of undetermined significance - usually does not change follow-up recommendations or screenings unless  reclassified in the future as a definitive mutation.   Pt will think about genetic testing and will let me know if interested  12. Discussed use of tamoxifen for the reduction of breast cancer risk- Information regarding risk reducing medications given to pt verbally and in writing. Pt declines at this time due to the potential for hot flashes and blood clots.   13. BSE technique was demonstrated and explained. Related what to look and feel for on exam monthly. Pt verbalized understanding and return demonstrated proper technique and knowledge of what to look for on self-exam. Pt instructed to call if notes any changes. Contact information given.   One hour- 60 minutes was spent with this patient and 75% was spent identifying risk factors, reviewing records and educating pt regarding risk reduction strategies and planning future screenings.    Left heel pain- plain film of left foot and referral to podiatry for evaluation of left heel pain that is not improving over the past 5 months            Information about strategies to decrease breast cancer risk factors from Up to Date given to the pt.

## 2019-12-03 ENCOUNTER — OFFICE VISIT (OUTPATIENT)
Dept: PODIATRY | Facility: CLINIC | Age: 54
End: 2019-12-03
Payer: COMMERCIAL

## 2019-12-03 ENCOUNTER — PATIENT MESSAGE (OUTPATIENT)
Dept: SURGERY | Facility: CLINIC | Age: 54
End: 2019-12-03

## 2019-12-03 ENCOUNTER — TELEPHONE (OUTPATIENT)
Dept: ENDOSCOPY | Facility: HOSPITAL | Age: 54
End: 2019-12-03

## 2019-12-03 VITALS
DIASTOLIC BLOOD PRESSURE: 77 MMHG | BODY MASS INDEX: 23.37 KG/M2 | HEART RATE: 67 BPM | WEIGHT: 127 LBS | HEIGHT: 62 IN | SYSTOLIC BLOOD PRESSURE: 113 MMHG

## 2019-12-03 DIAGNOSIS — M72.2 PLANTAR FASCIITIS: Primary | ICD-10-CM

## 2019-12-03 PROCEDURE — 99203 OFFICE O/P NEW LOW 30 MIN: CPT | Mod: S$GLB,,, | Performed by: PODIATRIST

## 2019-12-03 PROCEDURE — 99203 PR OFFICE/OUTPT VISIT, NEW, LEVL III, 30-44 MIN: ICD-10-PCS | Mod: S$GLB,,, | Performed by: PODIATRIST

## 2019-12-03 PROCEDURE — 99999 PR PBB SHADOW E&M-EST. PATIENT-LVL III: CPT | Mod: PBBFAC,,, | Performed by: PODIATRIST

## 2019-12-03 PROCEDURE — 99999 PR PBB SHADOW E&M-EST. PATIENT-LVL III: ICD-10-PCS | Mod: PBBFAC,,, | Performed by: PODIATRIST

## 2019-12-03 RX ORDER — SODIUM, POTASSIUM,MAG SULFATES 17.5-3.13G
1 SOLUTION, RECONSTITUTED, ORAL ORAL DAILY
Qty: 1 KIT | Refills: 0 | Status: SHIPPED | OUTPATIENT
Start: 2019-12-03 | End: 2019-12-05

## 2019-12-03 NOTE — PROGRESS NOTES
Subjective:       Patient ID: Jessica Walker is a 54 y.o. female.    Chief Complaint: Heel Pain (Pt describes pain in the left heel going up the ankle. Pt states pain 7/10. Non diabetic pt. Wears tennis with shoes. PCP Dr Garcia.)      HPI: Jessica Walker complains of moderate to severe pains to the left foot. States pains are sharp and stabbing-like in nature. Pains are to the plantar foot, mostly with walking and standing. Rates the pains at approx.  Seven/10. States post-static dyskinesia. Denies any recent identifiable trauma. Does limp with gait. Has utilize Aleve without success. Pains have been present for the past several weeks to months and the pains have worsened over the past couple of weeks.  He recently had x-rays which showed a plantar spur prior to the plantar aspect of the calcaneus at the insertion of plantar fascia.. States walking and standing causes and/or exacerbates the symptoms. Patient's Primary Care Provider is Radha Garcia MD.     Review of patient's allergies indicates:   Allergen Reactions    Penicillins        History reviewed. No pertinent past medical history.    Family History   Problem Relation Age of Onset    Heart attack Father     Breast cancer Mother        Social History     Socioeconomic History    Marital status:      Spouse name: Not on file    Number of children: Not on file    Years of education: Not on file    Highest education level: Not on file   Occupational History    Not on file   Social Needs    Financial resource strain: Not on file    Food insecurity:     Worry: Not on file     Inability: Not on file    Transportation needs:     Medical: Not on file     Non-medical: Not on file   Tobacco Use    Smoking status: Never Smoker    Smokeless tobacco: Never Used   Substance and Sexual Activity    Alcohol use: Not Currently    Drug use: Never    Sexual activity: Yes     Partners: Male   Lifestyle    Physical activity:     Days per week: Not on  "file     Minutes per session: Not on file    Stress: Not on file   Relationships    Social connections:     Talks on phone: Not on file     Gets together: Not on file     Attends Gnosticist service: Not on file     Active member of club or organization: Not on file     Attends meetings of clubs or organizations: Not on file     Relationship status: Not on file   Other Topics Concern    Not on file   Social History Narrative    Not on file       Past Surgical History:   Procedure Laterality Date    HYSTERECTOMY  2009    large fibroid, retains ovaries     INNER EAR SURGERY Bilateral        Review of Systems   Constitutional: Negative for activity change, appetite change, chills and fever.   HENT: Negative for sinus pain, sore throat and voice change.    Eyes: Negative for pain, redness and visual disturbance.   Respiratory: Negative for cough and shortness of breath.    Cardiovascular: Negative for chest pain and palpitations.   Gastrointestinal: Negative for diarrhea, nausea and vomiting.   Musculoskeletal: Positive for gait problem (Left foot pain). Negative for back pain and joint swelling.   Skin: Negative for color change and wound.   Neurological: Negative for dizziness, weakness and numbness.   Psychiatric/Behavioral: The patient is not nervous/anxious.          Objective:   /77   Pulse 67   Ht 5' 2" (1.575 m)   Wt 57.6 kg (126 lb 15.8 oz)   BMI 23.23 kg/m²     X-Ray Foot Complete 3 view Left  Narrative: EXAMINATION:  XR FOOT COMPLETE 3 VIEW LEFT    CLINICAL HISTORY:  . Pain in left foot    TECHNIQUE:  AP, lateral, and oblique views of the foot were performed.    COMPARISON:  None    FINDINGS:  There is no evidence to suggest acute fracture or dislocation.  The joint spaces appear to be well maintained.  A small plantar calcaneal enthesophyte is noted.  Impression: As above    Electronically signed by: Bal Vargas DO  Date:    12/02/2019  Time:    14:49      Physical Exam  LOWER EXTREMITY " PHYSICAL EXAMINATION    VASCULAR: The left DP is 2/4. The left PT pulse is 2/4. Proximal to distal, warm to warm. No dependent rubor or elevation palor is noted. Capillary refill time is less than 3 seconds. Hair growth is appreciated to the dorsal foot and digits.    NEUROLOGY: Proprioception is intact, bilateral. Sensation to light touch is intact. Negative Tinel's Sign and negative Valleix Sign. No neurological sensations with compression of the area of Mukherjee's Nerve in the area of the Abductor Hallucis muscle belly.    ORTHOPEDIC:  Moderate to severe tenderness to palpation of the area around the plantar medial calcaneal tubercle on the left foot. Pains are characterized as sharp and stabbing-like with direct palpation of the area. There is also mild pain to palpation of the immediate plantar aspect of the heel, and no pains to the lateral band of the fascia. No edema is noted. No fullness is noted. No pains or defects are noted within the plantar fascia at the arch. No plantar fibromas are noted. No defects are noted within the ligament. Dorsiflexion of the MTPJs with simultaneous palpation of the fascia at the arch, does worsen and exacerbate the pains. No pains with medial to lateral compression of the heel. Equinus contracture is noted. Antalgic gait pattern is noted.     DERMATOLOGY: No ecchymosis is noted.  Skin is supple, dry and intact. Skin is supple.  No hyperkeratosis noted. No calluses.  No open wounds or ulcerations are noted.  No palpable plantar fibromas noted.    Assessment:     1. Plantar fasciitis-left foot          Plan:     Plantar fasciitis-left foot    I explained to the patient that etiology and treatment options for heel pain including rest,  ice messages, stretching exercises, strappings/tappings, NSAID's, injections, new shoegear with orthotic inserts, and/or surgical treatment. I also discussed a possible injection of steroid to help calm down the inflammation sooner. I expressed the  importance of wearing the orthotics in culmination of other treatment modalities. Patient agreed to stretching exercises and inserts. I gave written and verbal instructions on heel cord stretching and this was demonstrated for the patient. Patient expressed understanding and had the patient teach back the instructions.  Patient instructed on adequate icing techniques which should be done for acute pain and inflammation.  Patient would like to wait on injection at this time.We discussed the importance of wearing the orthotics to help elevate the arch which will allow for tension to be lessened to the plantar fascia and posterior heel cord.  Discussed the importance of the break-in period with the inserts by wearing them 2-3 hours for the 1st day and increasing their use an hour each day until able to tolerate a full work period.      No future appointments.

## 2019-12-03 NOTE — LETTER
December 3, 2019      [place]  [address]  [place] [address]           [facility]  [id number] [place] [id number]  Phone: [phone]  Fax: [phone]          Patient: Jessica Walker   MR Number: [id number]   YOB: 1965   Date of Visit: 12/3/2019       Dear Silvana Mcintosh:    Thank you for referring Jessica Walker to me for evaluation. Attached you will find relevant portions of my assessment and plan of care.    If you have questions, please do not hesitate to call me. I look forward to following Jessica Walker along with you.    Sincerely,    Sherlyn Dennis, ALLEN    Enclosure  CC:  No Recipients    If you would like to receive this communication electronically, please contact [email or web address] or [phone] to request more information on Why Not Give Back Link access.    For providers and/or their staff who would like to refer a patient to [facility], please contact us through our one-stop-shop provider referral line, [facility] , at [phone].    If you feel you have received this communication in error or would no longer like to receive these types of communications, please e-mail [email or web address]

## 2019-12-03 NOTE — TELEPHONE ENCOUNTER
Endoscopy Scheduling Questionnaire:    1. Have you been admitted overnight to the hospital in the past 3 months? no  2. Have you had a cardiac stent placed in the past 12 months? no  3. Have you had a stroke or heart attack in the past 6 months? no  4. Have you had any chest pain in the past 3 months? no      If so, have you been evaluated by your PCP or Cardiologist? no  5. Do you take prescription weight loss medication? no  6. Have you been diagnosed with Diverticulitis within the past 3 months? no  7. Are you on dialysis? no  8. Are you diabetic? no Do you have an insulin pump? no  9. Do you have any other health issues that you feel might limit your ability to safely have the procedure and/or sedation? no  10. Is the patient over 81 yo? no        If so, has the patient been seen by their PCP or GI in the last 6 months? N/A       -I have reviewed the last colonoscopy for recommendations regarding surveillance and bowel prep  is NA  -I have reviewed the patient's medications and allergies. She is not on high risk medication, A clearance request NA  -I have verified the pharmacy information. The prep being used is Suprep. The patient's prep instructions were sent via [facility] patient portal..    Date Endoscopy Scheduled: Date: 1/23/20

## 2019-12-16 ENCOUNTER — PATIENT MESSAGE (OUTPATIENT)
Dept: PODIATRY | Facility: CLINIC | Age: 54
End: 2019-12-16

## 2019-12-18 ENCOUNTER — OFFICE VISIT (OUTPATIENT)
Dept: PODIATRY | Facility: CLINIC | Age: 54
End: 2019-12-18
Payer: COMMERCIAL

## 2019-12-18 VITALS
SYSTOLIC BLOOD PRESSURE: 110 MMHG | DIASTOLIC BLOOD PRESSURE: 77 MMHG | WEIGHT: 127 LBS | HEART RATE: 63 BPM | BODY MASS INDEX: 23.23 KG/M2

## 2019-12-18 DIAGNOSIS — M72.2 PLANTAR FASCIITIS: Primary | ICD-10-CM

## 2019-12-18 PROCEDURE — 20550: ICD-10-PCS | Mod: LT,S$GLB,, | Performed by: PODIATRIST

## 2019-12-18 PROCEDURE — 99999 PR PBB SHADOW E&M-EST. PATIENT-LVL II: ICD-10-PCS | Mod: PBBFAC,,, | Performed by: PODIATRIST

## 2019-12-18 PROCEDURE — 99999 PR PBB SHADOW E&M-EST. PATIENT-LVL II: CPT | Mod: PBBFAC,,, | Performed by: PODIATRIST

## 2019-12-18 PROCEDURE — 20550 NJX 1 TENDON SHEATH/LIGAMENT: CPT | Mod: LT,S$GLB,, | Performed by: PODIATRIST

## 2019-12-18 PROCEDURE — 99499 NO LOS: ICD-10-PCS | Mod: S$GLB,,, | Performed by: PODIATRIST

## 2019-12-18 PROCEDURE — 99499 UNLISTED E&M SERVICE: CPT | Mod: S$GLB,,, | Performed by: PODIATRIST

## 2019-12-18 RX ORDER — TRIAMCINOLONE ACETONIDE 40 MG/ML
40 INJECTION, SUSPENSION INTRA-ARTICULAR; INTRAMUSCULAR
Status: DISCONTINUED | OUTPATIENT
Start: 2019-12-18 | End: 2019-12-18 | Stop reason: HOSPADM

## 2019-12-18 RX ORDER — DEXAMETHASONE SODIUM PHOSPHATE 4 MG/ML
4 INJECTION, SOLUTION INTRA-ARTICULAR; INTRALESIONAL; INTRAMUSCULAR; INTRAVENOUS; SOFT TISSUE
Status: DISCONTINUED | OUTPATIENT
Start: 2019-12-18 | End: 2019-12-18 | Stop reason: HOSPADM

## 2019-12-18 RX ADMIN — TRIAMCINOLONE ACETONIDE 40 MG: 40 INJECTION, SUSPENSION INTRA-ARTICULAR; INTRAMUSCULAR at 11:12

## 2019-12-18 RX ADMIN — DEXAMETHASONE SODIUM PHOSPHATE 4 MG: 4 INJECTION, SOLUTION INTRA-ARTICULAR; INTRALESIONAL; INTRAMUSCULAR; INTRAVENOUS; SOFT TISSUE at 11:12

## 2019-12-18 NOTE — PROGRESS NOTES
Subjective:       Patient ID: Jessica Walker is a 54 y.o. female.    Chief Complaint: Follow-up (Left foot plantar fasciitis f/u. Pt states needing injection.)      HPI: Jessica Walker complains of continued pain to the plantar aspect of the left foot at the insertion of the plantar fascia.  She states that she has been wearing the inserts, stretching, and taking anti-inflammatories.  She does report that she has not been consistent with icing and stretching routinely.  She also reports that she is going to be going out of town and would like an injection to see if that can help alleviate the residual pain that she is having.  Reports that she has had some decrease in symptomology with post static dyskinesia. Patient's Primary Care Provider is Radha Garcia MD.     Review of patient's allergies indicates:   Allergen Reactions    Penicillins        History reviewed. No pertinent past medical history.    Family History   Problem Relation Age of Onset    Heart attack Father     Breast cancer Mother        Social History     Socioeconomic History    Marital status:      Spouse name: Not on file    Number of children: Not on file    Years of education: Not on file    Highest education level: Not on file   Occupational History    Not on file   Social Needs    Financial resource strain: Not on file    Food insecurity:     Worry: Not on file     Inability: Not on file    Transportation needs:     Medical: Not on file     Non-medical: Not on file   Tobacco Use    Smoking status: Never Smoker    Smokeless tobacco: Never Used   Substance and Sexual Activity    Alcohol use: Not Currently    Drug use: Never    Sexual activity: Yes     Partners: Male   Lifestyle    Physical activity:     Days per week: Not on file     Minutes per session: Not on file    Stress: Not on file   Relationships    Social connections:     Talks on phone: Not on file     Gets together: Not on file     Attends Sabianism  service: Not on file     Active member of club or organization: Not on file     Attends meetings of clubs or organizations: Not on file     Relationship status: Not on file   Other Topics Concern    Not on file   Social History Narrative    Not on file       Past Surgical History:   Procedure Laterality Date    HYSTERECTOMY  2009    large fibroid, retains ovaries     INNER EAR SURGERY Bilateral        Review of Systems   Constitutional: Negative for activity change, appetite change, chills and fever.   HENT: Negative for sinus pain, sore throat and voice change.    Eyes: Negative for pain, redness and visual disturbance.   Respiratory: Negative for cough and shortness of breath.    Cardiovascular: Negative for chest pain and palpitations.   Gastrointestinal: Negative for diarrhea, nausea and vomiting.   Musculoskeletal: Negative for back pain and joint swelling.   Skin: Negative for color change and wound.   Neurological: Negative for dizziness, weakness and numbness.   Psychiatric/Behavioral: The patient is not nervous/anxious.          Objective:   /77   Pulse 63   Wt 57.6 kg (126 lb 15.8 oz)   BMI 23.23 kg/m²     X-Ray Foot Complete 3 view Left  Narrative: EXAMINATION:  XR FOOT COMPLETE 3 VIEW LEFT    CLINICAL HISTORY:  . Pain in left foot    TECHNIQUE:  AP, lateral, and oblique views of the foot were performed.    COMPARISON:  None    FINDINGS:  There is no evidence to suggest acute fracture or dislocation.  The joint spaces appear to be well maintained.  A small plantar calcaneal enthesophyte is noted.  Impression: As above    Electronically signed by: Bal Vargas DO  Date:    12/02/2019  Time:    14:49      Physical Exam  LOWER EXTREMITY PHYSICAL EXAMINATION    VASCULAR: The  left DP is 2/4. The  left PT pulse is 2/4. Proximal to distal, warm to warm. No dependent rubor or elevation palor is noted. Capillary refill time is less than 3 seconds. Hair growth is appreciated to the dorsal foot and  digits.    NEUROLOGY: Proprioception is intact, bilateral. Sensation to light touch is intact. Negative Tinel's Sign and negative Valleix Sign. No neurological sensations with compression of the area of Mukherjee's Nerve in the area of the Abductor Hallucis muscle belly.    ORTHOPEDIC:  Mild tenderness to palpation of the area around the plantar medial calcaneal tubercle on the left foot. Pains are characterized as sharp and stabbing-like with direct palpation of the area. There is also no pain to palpation of the immediate plantar aspect of the heel or to the lateral band of the fascia.  No pains or defects are noted within the plantar fascia at the arch. No plantar fibromas are noted. No defects are noted within the ligament. Equinus contracture is noted. Antalgic gait pattern is noted.     DERMATOLOGY: No ecchymosis is noted.  Skin is supple, dry and intact. Skin is supple.  No hyperkeratosis noted. No calluses.  No open wounds or ulcerations are noted.  No palpable plantar fibromas noted.    Assessment:     1. Plantar fasciitis-left foot          Plan:     Plantar fasciitis-left foot  -     Tendon Sheath-left plantar fascia      We continue discussed conservative treatment options for plantar fasciitis.  Patient has been utilizing inserts, stretching, icing, and anti-inflammatories for 2 weeks.  She states that she is going to be on out of town.  I discussed possible steroid injection as a potential to helping resolve her pain.  I discussed the some people do respond differently and there was no guarantee this would successfully resolve all remaining symptoms she is having from the left plantar fasciitis.  Patient stated understanding with this.    Following cleansing the area with alcohol/Betadine paint.  An injection was inserted place at the area of maximum pain at the left medial insertion of plantar fascia as identified in the physical exam using a 25 gauge needle.  The injection consisted of 0.5 cc of  dexamethasone sodium phosphate, 0.5 cc of triamcinolone acetate, and 1 cc of 0.75% Marcaine plain.  The patient tolerated procedure well without complications.  Patient was educated on the pathology of the current etiology.  We discussed with the patient that he may have increased pain, secondary to the injection/steroid flare, over the next couple days.    Encourage patient to continue to utilize the inserts as well as stretching and icing techniques.  Patient will continue using anti-inflammatories all she continue have symptoms.  I do encourage patient to follow up as needed    No future appointments.

## 2019-12-18 NOTE — PROCEDURES
Tendon Sheath-left plantar fascia  Date/Time: 12/18/2019 11:40 AM  Performed by: Sherlyn Dennis DPM  Authorized by: Sherlyn Dennis DPM     Consent Done?:  Yes (Written)  Timeout: prior to procedure the correct patient, procedure, and site was verified    Indications:  Pain  Site marked: the procedure site was marked    Timeout: prior to procedure the correct patient, procedure, and site was verified    Location:  Foot  Prep: patient was prepped and draped in usual sterile fashion    Ultrasonic guidance for needle placement?: No    Needle size:  25 G  Approach:  Medial  Medications:  4 mg dexamethasone 4 mg/mL; 40 mg triamcinolone acetonide 40 mg/mL  Patient tolerance:  Patient tolerated the procedure well with no immediate complications

## 2020-01-20 ENCOUNTER — TELEPHONE (OUTPATIENT)
Dept: ENDOSCOPY | Facility: HOSPITAL | Age: 55
End: 2020-01-20

## 2020-01-20 NOTE — TELEPHONE ENCOUNTER
Spoke with patient and rescheduled procedure from 1- d/t transportation concerns to 2- with dr mistry at the [facility].  Instructions sent via portal and patient verbalized understanding of the above.

## 2020-01-20 NOTE — TELEPHONE ENCOUNTER
----- Message from Shannon Perez sent at 1/20/2020  7:42 AM CST -----  Contact: Patient   Patient requesting a call back to possibly reschedule appointment to 2/14/2020 in the morning.Please call back at [phone]      Thanks,  Shannon Perez

## 2020-02-24 ENCOUNTER — TELEPHONE (OUTPATIENT)
Dept: ENDOSCOPY | Facility: HOSPITAL | Age: 55
End: 2020-02-24

## 2020-02-24 ENCOUNTER — PATIENT MESSAGE (OUTPATIENT)
Dept: ENDOSCOPY | Facility: HOSPITAL | Age: 55
End: 2020-02-24

## 2020-02-24 NOTE — TELEPHONE ENCOUNTER
"1. Have you been admitted overnight to the hospital in the past 3 months? no   If yes, schedule an appointment with PCP before scheduling endoscopic procedure.     2. Have you had a stent placed in the last 12 months? no   If yes, for a screening visit, cancel and message the ordering provider.  The patient will need a new order when the time is appropriate.     3. Have you had a stroke or heart attack in the past 6 months? no   If yes, cancel and refer patient to ordering provider for clearance, also message ordering provider to inform.     4. Have you had any chest pain in the past 3 months? no   If yes, Have you been evaluated by your PCP and/or cardiologist and it was determined to not be heart related? not applicable   If No, Pt needs to be seen by PCP or Cardiologist .  Pt can be scheduled once clearance obtained by either of those providers.     5. Do you take prescription weight loss medications?  no   If yes, must stop for 2 weeks prior to procedure.     6. Have you been diagnosed with diverticulitis within the past 3 months? no   If yes, must have been seen by GI within the last 3 months, if not schedule with GI [facility].    If pt has been seen by GI, schedule procedure 8-12 weeks post antibiotic treatment.     7. Are you on Dialysis? no  If yes, schedule procedure for the day AFTER dialysis.  Appt time should be 9am or later, patient arrival time is 2 hours prior.  Nulytely or miralax prep for all patients with kidney disease.     8. Are you diabetic?  no   If yes, schedule morning appt. Advise pt to hold all diabetic meds day of procedure.     9. If pt is older than 80 years of age and HAS NOT been seen by GI or PCP within the last 6 months, needs appt with GI [facility].   If pt has been seen by the GI provider or PCP within the past 6  months AND meets criteria, schedule procedure AND send message to the endoscopist.     10. Is patient on a "high risk" medication (blood thinner/antiplatelet agent)?  no   If yes, " has cardiac clearance been obtained within the last 60 days? N/A   If no, a new clearance needs to be obtained.       I have reviewed the last colonoscopy for recommendations regarding next procedure bowel prep.yes  I have reviewed medications and allergies.  yes  I have verified the pharmacy information and appropriate prep sent if needed. yes  Prep instructions have been mailed or sent to portal per patient request. yes  Sent new portal information. dw  If answers yes to any of the following, schedule at [facility] ONLY. If No, OK for either location.     Is BMI over 45?   Any complaints of chest pain, new onset or at rest?  Does pt have an AICD?  Is there a diagnosis of heart failure?  Is patient on dialysis?  Does patient have an insulin pump?  If procedure for esophageal banding?    03- [place]

## 2020-03-09 ENCOUNTER — PATIENT MESSAGE (OUTPATIENT)
Dept: PALLIATIVE MEDICINE | Facility: CLINIC | Age: 55
End: 2020-03-09

## 2020-03-11 ENCOUNTER — OFFICE VISIT (OUTPATIENT)
Dept: PALLIATIVE MEDICINE | Facility: CLINIC | Age: 55
End: 2020-03-11
Payer: COMMERCIAL

## 2020-03-11 VITALS
WEIGHT: 125.88 LBS | TEMPERATURE: 98 F | BODY MASS INDEX: 23.17 KG/M2 | HEART RATE: 54 BPM | SYSTOLIC BLOOD PRESSURE: 101 MMHG | HEIGHT: 62 IN | OXYGEN SATURATION: 100 % | DIASTOLIC BLOOD PRESSURE: 69 MMHG

## 2020-03-11 DIAGNOSIS — T75.3XXS MOTION SICKNESS, SEQUELA: ICD-10-CM

## 2020-03-11 DIAGNOSIS — Z13.220 SCREENING, LIPID: ICD-10-CM

## 2020-03-11 DIAGNOSIS — Z00.00 WELLNESS EXAMINATION: Primary | ICD-10-CM

## 2020-03-11 DIAGNOSIS — E55.9 VITAMIN D DEFICIENCY: ICD-10-CM

## 2020-03-11 PROCEDURE — 99396 PREV VISIT EST AGE 40-64: CPT | Mod: S$GLB,,, | Performed by: FAMILY MEDICINE

## 2020-03-11 PROCEDURE — 99999 PR PBB SHADOW E&M-EST. PATIENT-LVL IV: ICD-10-PCS | Mod: PBBFAC,,, | Performed by: FAMILY MEDICINE

## 2020-03-11 PROCEDURE — 99999 PR PBB SHADOW E&M-EST. PATIENT-LVL IV: CPT | Mod: PBBFAC,,, | Performed by: FAMILY MEDICINE

## 2020-03-11 PROCEDURE — 99396 PR PREVENTIVE VISIT,EST,40-64: ICD-10-PCS | Mod: S$GLB,,, | Performed by: FAMILY MEDICINE

## 2020-03-11 RX ORDER — DIAZEPAM 2 MG/1
2 TABLET ORAL EVERY 6 HOURS PRN
Qty: 30 TABLET | Refills: 0 | Status: SHIPPED | OUTPATIENT
Start: 2020-03-11 | End: 2020-05-27

## 2020-03-11 NOTE — PROGRESS NOTES
Subjective:       Patient ID: Jessica Walker is a 55 y.o. female.    Chief Complaint: Annual Exam    56 yo female here for annual exam. She reports she's doing well. Only request is refill of diazepam for her severe motion sickness. She has cscope scheduled 3/27/20. Saw Silvana Mcintosh for high risk breast ca exam; she'll follow up with her every 6 months. She has been having hot flashes, cyclical breast tenderness and persistent abdominal bloating. No abdominal pain. She is exercising 30 minutes every morning. Also doing weight watchers.       does not have any pertinent problems on file.  History reviewed. No pertinent past medical history.  Past Surgical History:   Procedure Laterality Date    HYSTERECTOMY  2009    large fibroid, retains ovaries     INNER EAR SURGERY Bilateral      Family History   Problem Relation Age of Onset    Heart attack Father     Breast cancer Mother      Social History     Socioeconomic History    Marital status:      Spouse name: Not on file    Number of children: Not on file    Years of education: Not on file    Highest education level: Not on file   Occupational History    Not on file   Social Needs    Financial resource strain: Not on file    Food insecurity:     Worry: Not on file     Inability: Not on file    Transportation needs:     Medical: Not on file     Non-medical: Not on file   Tobacco Use    Smoking status: Never Smoker    Smokeless tobacco: Never Used   Substance and Sexual Activity    Alcohol use: Not Currently    Drug use: Never    Sexual activity: Yes     Partners: Male   Lifestyle    Physical activity:     Days per week: Not on file     Minutes per session: Not on file    Stress: Not on file   Relationships    Social connections:     Talks on phone: Not on file     Gets together: Not on file     Attends [employer] service: Not on file     Active member of club or organization: Not on file     Attends meetings of clubs or organizations: Not on  file     Relationship status: Not on file   Other Topics Concern    Not on file   Social History Narrative    Not on file     Review of Systems   A comprehensive 14-point review of systems was reviewed with patient and was negative other than as specified above.     Objective:     Vitals:    03/11/20 0818   BP: 101/69   Pulse: (!) 54   Temp: 97.6 °F (36.4 °C)        Physical Exam   Constitutional: She is oriented to person, place, and time. She appears well-developed and well-nourished.   HENT:   Head: Normocephalic and atraumatic.   Eyes: Pupils are equal, round, and reactive to light. EOM are normal.   Neck: Normal range of motion. Neck supple.   Cardiovascular: Normal rate and regular rhythm.   Pulmonary/Chest: Effort normal and breath sounds normal.   Musculoskeletal: Normal range of motion. She exhibits no deformity.   Neurological: She is alert and oriented to person, place, and time.   Skin: Skin is warm and dry.   Psychiatric: She has a normal mood and affect. Her behavior is normal.   Nursing note and vitals reviewed.      Assessment:       1. Wellness examination    2. Vitamin D deficiency    3. Screening, lipid    4. Motion sickness, sequela        Plan:           Problem List Items Addressed This Visit     None      Visit Diagnoses     Wellness examination    -  Primary    Relevant Orders    Ambulatory referral/consult to Optometry    CBC auto differential    Comprehensive metabolic panel    Ambulatory referral/consult to Dermatology    Vitamin D deficiency        Relevant Orders    Vitamin D    Screening, lipid        Relevant Orders    Lipid panel    Motion sickness, sequela        Relevant Medications    diazePAM (VALIUM) 2 MG tablet

## 2020-03-11 NOTE — PATIENT INSTRUCTIONS
Arm and Hammer Nasal saline rinse; Ayr is another good brand. Follow this with Flonase Sensimist      Step-by-Step  Using Nasal Spray    Date Last Reviewed: 5/27/2015  © 1987-7824 [employer]. [address] [place] [address], [place] [address]. All rights reserved. This information is not intended as a substitute for professional medical care. Always follow your healthcare professional's instructions.              Mediterranean Food Guide   People who live in the area around the [place] have a lower risk of heart disease. Researchers have recently shown that following a Mediterranean diet decreases heart disease by 30% in people whom are at-risk.   This lifestyle is built upon daily exercise along with a lot of fruit, vegetables, plant-based proteins, whole grains, fish and smaller amounts of poultry and red meat. Fatty fish (salmon), olive oil, and nuts make this diet higher in fat than the classic heart healthy diet. These fats are mostly unsaturated, and when consumed in place of saturated fat, are good for the heart.   Physical Activity- The Mediterranean Diet pyramid is built upon daily physical activity and exercise. Aim for at least 150 minutes of moderate to vigorous exercise every week. Moderate-to-vigorous exercises include walking at a brisk pace, biking, or swimming, among other activities that elevate your heart rate. Always choose activities that you enjoy and that are safe, in order to be active throughout your life.   Achieve and Maintain a Healthy Weight - The high fat content of the Mediterranean is healthy for your heart, but may lead to increased energy intake and weight gain if you dont pay attention to how much you are eating. If you are trying to lose weight, choose the smaller number of servings from each food group, and try to make your serving sizes match those listed. 2   Food Groups and Servings per day  Serving Sizes    Non-starchy Vegetables   4-8 servings per day  One  serving is ½ cup of cooked vegetables or 1 cup raw vegetables   Non-starchy vegetables include artichoke, asparagus, beets, broccoli, [place] sprouts, cauliflower, cabbage, celery, carrots, tomatoes, eggplant, cucumber, onion, green and wax beans, zucchini, turnips, peppers, salad greens and mushrooms. (Potatoes, peas, and corn are starchy vegetables.)    Fruit   2-4 servings per day  One serving is a small fresh fruit or ½ cup juice or ¼ cup dried fruit   Fresh fruits are preferred because of the fiber and other nutrients they contain. Fruits canned in light syrup or their own juice, and frozen fruit with little or no added sugar are also good choices. Use only small amounts of fruit juice (6 oz per day or less), since even unsweetened juices can contain as much sugar as regular soda.    Legumes and Nuts   1-3 servings per day  Legumes: One serving is ½ cup cooked kidney, black, garbanzo, zelaya, soy (edamame), navy beans, split peas, or lentils, or ¼ cup fat free refried beans or baked beans   Nuts and Seeds: One serving is 2 Tbsp. sunflower or sesame seeds, 1 Tbsp. peanut butter,   7-8 walnuts or pecans, 20 peanuts, or 12-15 almonds   Aim for 1-2 servings of nuts or seeds and 1-2 servings of legumes per day. Legumes are high in fiber, protein, and minerals. Nuts are high in unsaturated fat, and may increase HDL without increasing LDL cholesterol levels.    Low-fat Dairy Products   1-3 servings per day  One serving is 1 cup of skim milk, non-fat yogurt, or 1oz of low-fat (part-skim) cheese   Calcium-fortified soy milk, soy yogurt, and soy cheese can take the place of dairy products. If servings of dairy or fortified soy are less than 2 per day, we advise a calcium and vitamin D supplement.    Fish or shellfish   2-3 times a week  One serving is 3 ounces (about the size of a deck of cards)   Cook fish by baking, sautéing, broiling, roasting, grilling, or poaching. Choose fatty fishes like salmon, herring, sardines,  or mackerel often. The fat in fish is high in omega-3 fats, so it has healthy effects on triglycerides and blood cells.    Poultry, if desired   1-3 times a week  One serving is 3 ounces (about the size of a deck of cards)   Bake, sauté, stir holman, roast, or grill the poultry you eat, and eat it without the skin.    Whole Grains and Starchy Vegetables   4-6 servings per day  One serving is about 1 ounce of any of these:   1 slice whole wheat bread ½ cup potatoes, sweet potatoes, corn, or peas   ½ large whole grain bun 1 small whole grain roll   6-inch whole wheat rich 6 whole grain crackers   ½ cup cooked whole grain cereal (oatmeal, cracked wheat, quinoa)   ½ cup cooked whole wheat pasta, brown rice, or barley   Whole grains are high in fiber and have less effect on blood sugar and triglyceride levels than refined, processed grains like white bread and pasta. Whole grains also keep the stomach full longer, making it easier to control hunger.

## 2020-03-16 ENCOUNTER — TELEPHONE (OUTPATIENT)
Dept: ENDOSCOPY | Facility: HOSPITAL | Age: 55
End: 2020-03-16

## 2020-03-31 ENCOUNTER — PATIENT MESSAGE (OUTPATIENT)
Dept: PALLIATIVE MEDICINE | Facility: CLINIC | Age: 55
End: 2020-03-31

## 2020-03-31 DIAGNOSIS — F98.8 ATTENTION DEFICIT DISORDER, UNSPECIFIED HYPERACTIVITY PRESENCE: Primary | ICD-10-CM

## 2020-04-07 ENCOUNTER — PATIENT MESSAGE (OUTPATIENT)
Dept: ADMINISTRATIVE | Facility: HOSPITAL | Age: 55
End: 2020-04-07

## 2020-04-09 ENCOUNTER — PATIENT MESSAGE (OUTPATIENT)
Dept: PSYCHIATRY | Facility: CLINIC | Age: 55
End: 2020-04-09

## 2020-04-20 ENCOUNTER — PATIENT MESSAGE (OUTPATIENT)
Dept: PALLIATIVE MEDICINE | Facility: CLINIC | Age: 55
End: 2020-04-20

## 2020-05-18 ENCOUNTER — TELEPHONE (OUTPATIENT)
Dept: ENDOSCOPY | Facility: HOSPITAL | Age: 55
End: 2020-05-18

## 2020-05-18 ENCOUNTER — PATIENT MESSAGE (OUTPATIENT)
Dept: PALLIATIVE MEDICINE | Facility: CLINIC | Age: 55
End: 2020-05-18

## 2020-05-18 DIAGNOSIS — Z12.11 COLON CANCER SCREENING: Primary | ICD-10-CM

## 2020-05-18 NOTE — TELEPHONE ENCOUNTER
Spoke with patient - went through Covid and Endo screening questions. Procedure scheduled 6/19. Covid order placed

## 2020-05-26 ENCOUNTER — PATIENT OUTREACH (OUTPATIENT)
Dept: ADMINISTRATIVE | Facility: OTHER | Age: 55
End: 2020-05-26

## 2020-05-26 ENCOUNTER — PATIENT MESSAGE (OUTPATIENT)
Dept: OBSTETRICS AND GYNECOLOGY | Facility: CLINIC | Age: 55
End: 2020-05-26

## 2020-05-27 ENCOUNTER — OFFICE VISIT (OUTPATIENT)
Dept: OBSTETRICS AND GYNECOLOGY | Facility: CLINIC | Age: 55
End: 2020-05-27
Payer: COMMERCIAL

## 2020-05-27 VITALS — BODY MASS INDEX: 23.87 KG/M2 | WEIGHT: 130.5 LBS | SYSTOLIC BLOOD PRESSURE: 96 MMHG | DIASTOLIC BLOOD PRESSURE: 64 MMHG

## 2020-05-27 DIAGNOSIS — R23.2 VASOMOTOR FLUSHING: Primary | ICD-10-CM

## 2020-05-27 PROCEDURE — 99396 PR PREVENTIVE VISIT,EST,40-64: ICD-10-PCS | Mod: S$GLB,,, | Performed by: NURSE PRACTITIONER

## 2020-05-27 PROCEDURE — 99999 PR PBB SHADOW E&M-EST. PATIENT-LVL II: CPT | Mod: PBBFAC,,, | Performed by: NURSE PRACTITIONER

## 2020-05-27 PROCEDURE — 99396 PREV VISIT EST AGE 40-64: CPT | Mod: S$GLB,,, | Performed by: NURSE PRACTITIONER

## 2020-05-27 PROCEDURE — 99999 PR PBB SHADOW E&M-EST. PATIENT-LVL II: ICD-10-PCS | Mod: PBBFAC,,, | Performed by: NURSE PRACTITIONER

## 2020-05-27 RX ORDER — ESTRADIOL 1 MG/1
1 TABLET ORAL DAILY
Qty: 30 TABLET | Refills: 3 | Status: SHIPPED | OUTPATIENT
Start: 2020-05-27 | End: 2020-06-26

## 2020-05-27 NOTE — PROGRESS NOTES
"CC: Well woman exam    Jessica Walker is a 55 y.o. female  presents for a well woman exam.  Patient is s/p benign hysterectomy with ovary conservation. Is sexually active. Patient reports an increase in " hot flashes, unable to sleep". OTC supplements are not resolving her issue. Last mammogram, 2019, was normal. Bone scan, 2019, osteopenia. Mother was dx with breast cancer, patient is follow-ed by BRENT Mcintosh NP     History reviewed. No pertinent past medical history.  Past Surgical History:   Procedure Laterality Date    HYSTERECTOMY  2009    large fibroid, retains ovaries     INNER EAR SURGERY Bilateral      Family History   Problem Relation Age of Onset    Heart attack Father     Breast cancer Mother     Prostate cancer Brother      Social History     Tobacco Use    Smoking status: Never Smoker    Smokeless tobacco: Never Used   Substance Use Topics    Alcohol use: Not Currently    Drug use: Never     OB History        0    Para   0    Term   0       0    AB   0    Living   0       SAB   0    TAB   0    Ectopic   0    Multiple   0    Live Births   0                 BP 96/64   Wt 59.2 kg (130 lb 8.2 oz)   BMI 23.87 kg/m²     ROS:  GENERAL: Denies weight gain or weight loss. Feeling well overall.   SKIN: Denies rash or lesions.   HEAD: Denies head injury or headache.   NODES: Denies enlarged lymph nodes.   CHEST: Denies chest pain or shortness of breath.   CARDIOVASCULAR: Denies palpitations or left sided chest pain.   ABDOMEN: No abdominal pain, constipation, diarrhea, nausea, vomiting or rectal bleeding.   URINARY: No frequency, dysuria, hematuria, or burning on urination.  REPRODUCTIVE: See HPI.   BREASTS: The patient performs breast self-examination and denies pain, lumps, or nipple discharge.   HEMATOLOGIC: No easy bruisability or excessive bleeding.   MUSCULOSKELETAL: Denies joint pain or swelling.   NEUROLOGIC: Denies syncope or weakness.   PSYCHIATRIC: Denies depression, " anxiety or mood swings.    PE:   APPEARANCE: Well nourished, well developed, in no acute distress.  AFFECT: WNL, alert and oriented x 3.  SKIN: No acne or hirsutism.  NECK: Neck symmetric without masses or thyromegaly.  NODES: No inguinal, cervical, axillary or femoral lymph node enlargement.  CHEST: Good respiratory effort.   ABDOMEN: Soft. No tenderness or masses. No hepatosplenomegaly. No hernias.  BREASTS: Symmetrical, no skin changes or visible lesions. No palpable masses, nipple discharge bilaterally.  PELVIC: Normal external female genitalia without lesions. Normal hair distribution. Adequate perineal body, normal urethral meatus. Vagina atrophic without lesions or discharge. No significant cystocele or rectocele. Bimanual exam shows uterus and cervix to be surgically absent. Adnexa without masses or tenderness.  RECTAL: Rectovaginal exam confirms above with normal sphincter tone, no masses.  EXTREMITIES: No edema.    PLAN:  A full discussion of the benefit-risk ratio of hormonal replacement therapy was carried out. Improvement in vasomotor and other climacteric symptoms is discussed, including possible improvements in sleep and mood. Reduction of risk for osteoporosis was explained. We discussed the study data showing increased risk of thrombo-embolic events such as myocardial infarction, stroke and also possibly breast cancer with estrogen replacement, and how this might affect her. The range of side effects such as breast tenderness, weight gain and including possible increases in lifetime risk of breast cancer and possible thrombotic complications was discussed. We also discussed ACOG's recommendation to use hormone replacement therapy for the relief of hot flashes alone and to be on the lowest dose possible for the shortest amount of time.  Alternative such as herbal and soy-based products were reviewed. All of her questions about this therapy were answered.  If taking hormones, pt knows and understands  to discontinue immediately if she develops chest pain, heart problems, MI, DVT, CVA, breast cancer.  Follow- up with BRENT Mcintosh NP, appt given  Patient to start Estradiol 1 mg for three-months.  Patient aware of the need to contact clinic as a follow-up to HRT within those three-,months.

## 2020-05-27 NOTE — PROGRESS NOTES
Chart reviewed.   Immunizations: LINKS failed   Orders placed: n/a  Upcoming appts to satisfy TRISTAN topics: n/a

## 2020-05-28 ENCOUNTER — TELEPHONE (OUTPATIENT)
Dept: ENDOSCOPY | Facility: HOSPITAL | Age: 55
End: 2020-05-28

## 2020-05-28 NOTE — TELEPHONE ENCOUNTER
Patient called office to request that new day for procedure.  Rescheduled procedure from 6- to 6-.  Patient is aware of covid-19 testing date/time.

## 2020-06-01 ENCOUNTER — OFFICE VISIT (OUTPATIENT)
Dept: SURGERY | Facility: CLINIC | Age: 55
End: 2020-06-01
Payer: COMMERCIAL

## 2020-06-01 VITALS
SYSTOLIC BLOOD PRESSURE: 120 MMHG | TEMPERATURE: 98 F | HEART RATE: 71 BPM | BODY MASS INDEX: 23.87 KG/M2 | WEIGHT: 130.5 LBS | DIASTOLIC BLOOD PRESSURE: 64 MMHG

## 2020-06-01 DIAGNOSIS — Z80.3 FAMILY HISTORY OF BREAST CANCER: ICD-10-CM

## 2020-06-01 DIAGNOSIS — Z91.89 AT HIGH RISK FOR BREAST CANCER: Primary | ICD-10-CM

## 2020-06-01 DIAGNOSIS — Z12.39 BREAST CANCER SCREENING: ICD-10-CM

## 2020-06-01 DIAGNOSIS — Z80.3 FAMILY HISTORY OF BREAST CANCER IN MOTHER: ICD-10-CM

## 2020-06-01 PROCEDURE — 99214 OFFICE O/P EST MOD 30 MIN: CPT | Mod: S$GLB,,, | Performed by: NURSE PRACTITIONER

## 2020-06-01 PROCEDURE — 99999 PR PBB SHADOW E&M-EST. PATIENT-LVL III: CPT | Mod: PBBFAC,,, | Performed by: NURSE PRACTITIONER

## 2020-06-01 PROCEDURE — 99999 PR PBB SHADOW E&M-EST. PATIENT-LVL III: ICD-10-PCS | Mod: PBBFAC,,, | Performed by: NURSE PRACTITIONER

## 2020-06-01 PROCEDURE — 99214 PR OFFICE/OUTPT VISIT, EST, LEVL IV, 30-39 MIN: ICD-10-PCS | Mod: S$GLB,,, | Performed by: NURSE PRACTITIONER

## 2020-06-01 NOTE — PROGRESS NOTES
Patient ID: Jessica Walker is a 55 y.o. female.    Chief Complaint: at high risk for breast cancer      HPI:patient presents for the evaluation of an elevated breast cancer risk assessment score of 20.9%    Risk factors identified:     Menarche at 16 y/o  G  0 P 0  First pregnancy at none  LMP: hyst 45 y/o  Estrogen:started 1 mg estrace 5 days ago for hot flash mgt  Radiation to the neck or chest wall- none  Prior breast biopsies or atypical hyperplasia- none      ETOH: none     FH: mother at 74 y/o breast cancer- bilateral- no other cancers- did not do genetic testing     Body mass index is 23.87 kg/m².    Review of Systems   Constitutional: Negative.  Negative for activity change, appetite change, chills, diaphoresis, fatigue, fever and unexpected weight change.        Debilitating hot flashes at night- unable to sleep- started 1 mg of estrace 5 days ago. Tolerating well with improvement in symptoms.    HENT: Negative.  Negative for congestion, ear pain, postnasal drip, rhinorrhea, sinus pressure, sneezing, sore throat and trouble swallowing.         Tiny left anterior palpable node that was noted about 3 months ago - thinks had a sore throat prior to appearance. Denies any change in it over time and no new palpable areas.    Eyes: Negative.  Negative for visual disturbance.   Respiratory: Negative.  Negative for apnea, cough, chest tightness, shortness of breath and wheezing.    Cardiovascular: Negative.  Negative for chest pain, palpitations and leg swelling.   Gastrointestinal: Negative.  Negative for abdominal distention, abdominal pain, anal bleeding, blood in stool, constipation, diarrhea, nausea and vomiting.        No reflux   Endocrine: Negative.    Genitourinary: Negative.  Negative for decreased urine volume, difficulty urinating, dysuria, frequency, hematuria, pelvic pain and urgency.   Musculoskeletal: Negative.  Negative for arthralgias, back pain, joint swelling and myalgias.   Skin: Negative.   Negative for color change and rash.   Allergic/Immunologic: Negative.    Neurological: Negative.  Negative for dizziness, syncope, weakness, light-headedness, numbness and headaches.   Hematological: Negative.  Negative for adenopathy. Does not bruise/bleed easily.   Psychiatric/Behavioral: Negative.  Negative for dysphoric mood and sleep disturbance. The patient is not nervous/anxious.    Breast: Pt denies any breast pain, nipple discharge, or palpable mass. No prior trauma or bruising. No breast surgeries or abnormalities.    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      estradioL (ESTRACE) 1 MG tablet Take 1 tablet (1 mg total) by mouth once daily. 30 tablet 3    magnesium 30 mg Tab Take by mouth once.       No current facility-administered medications for this visit.        Review of patient's allergies indicates:   Allergen Reactions    Penicillins        History reviewed. No pertinent past medical history.    Past Surgical History:   Procedure Laterality Date    HYSTERECTOMY  2009    large fibroid, retains ovaries     INNER EAR SURGERY Bilateral        Family History   Problem Relation Age of Onset    Heart attack Father     Breast cancer Mother     Prostate cancer Brother        Social History     Socioeconomic History    Marital status:      Spouse name: Not on file    Number of children: Not on file    Years of education: Not on file    Highest education level: Not on file   Occupational History    Not on file   Social Needs    Financial resource strain: Not on file    Food insecurity:     Worry: Not on file     Inability: Not on file    Transportation needs:     Medical: Not on file     Non-medical: Not on file   Tobacco Use    Smoking status: Never Smoker    Smokeless tobacco: Never Used   Substance and Sexual Activity    Alcohol use:  Not Currently    Drug use: Never    Sexual activity: Yes     Partners: Male     Birth control/protection: Surgical     Comment: hyst   Lifestyle    Physical activity:     Days per week: Not on file     Minutes per session: Not on file    Stress: Not on file   Relationships    Social connections:     Talks on phone: Not on file     Gets together: Not on file     Attends Sabianism service: Not on file     Active member of club or organization: Not on file     Attends meetings of clubs or organizations: Not on file     Relationship status: Not on file   Other Topics Concern    Not on file   Social History Narrative    Not on file       Vitals:    06/01/20 1103   BP: 120/64   Pulse: 71   Temp: 98.1 °F (36.7 °C)       Physical Exam   Constitutional: She is oriented to person, place, and time. She appears well-developed and well-nourished.   HENT:   Head: Normocephalic and atraumatic.   Right Ear: External ear normal.   Left Ear: External ear normal.   Mouth/Throat: No oropharyngeal exudate.   Eyes: Pupils are equal, round, and reactive to light. Conjunctivae and EOM are normal. Right eye exhibits no discharge. Left eye exhibits no discharge. No scleral icterus.   Neck: Normal range of motion. Neck supple. No thyromegaly present.   Cardiovascular: Normal rate, regular rhythm and normal heart sounds.   Pulmonary/Chest: Effort normal and breath sounds normal. Right breast exhibits no inverted nipple, no mass, no nipple discharge, no skin change and no tenderness. Left breast exhibits no inverted nipple, no mass, no nipple discharge, no skin change and no tenderness.   Abdominal: Soft. Bowel sounds are normal.   Musculoskeletal: Normal range of motion. She exhibits tenderness (left heel tender to palpation). She exhibits no deformity.        Right shoulder: She exhibits no crepitus and normal strength.   Lymphadenopathy:        Head (right side): Submental adenopathy present. No submandibular, no tonsillar, no  preauricular, no posterior auricular and no occipital adenopathy present.        Head (left side): No submental, no submandibular, no tonsillar, no preauricular, no posterior auricular and no occipital adenopathy present.     She has no cervical adenopathy.        Right cervical: No superficial cervical and no posterior cervical adenopathy present.       Left cervical: No superficial cervical and no posterior cervical adenopathy present.     She has no axillary adenopathy.        Right: No supraclavicular adenopathy present.        Left: No supraclavicular adenopathy present.   Tiny 5 mm very mobile palpable node anterior right neck. Not tender. No change per pt report over past 3 months   Neurological: She is alert and oriented to person, place, and time. She has normal reflexes.   Skin: Skin is warm and dry. No rash noted. No erythema. No pallor.   Psychiatric: She has a normal mood and affect. Her behavior is normal. Judgment and thought content normal.          Assessment & Plan:  At high risk for breast cancer  -     Mammo Digital Screening Bilat; Future; Expected date: 06/02/2021    Family history of breast cancer in mother  -     Mammo Digital Screening Bilat; Future; Expected date: 06/02/2021    Breast cancer screening  -     Mammo Digital Screening Bilat; Future; Expected date: 06/02/2021    Family history of breast cancer      1. Negative clinical findings on exam. Small subcentimeter palpable right anterior node. Smooth and mobile. Continue to monitor for change- if notes any enlargement or if develops any other nodes- discussed where can find- pt will report  2. Negative imaging- elevated risk for breast cancer Sept 2019  3. Explained results of risk assessment and recommendations for additional screening with MRI in 6 months alternating with Diagnostic mammograms. Pt not a candidate for MRI due to stainless dotson tubes in ears.   4. Next imaging due: [facility] mammo Sept 2020  5. Discussed modifiable risk  factors such as diet and exercise. Reviewed diet and counseled pt regarding eating more fruits and vegetables throughout the day.  6. Diet modifications:none- pt doing well  7. Diet/Weight goals- maintain healthy wt and exercise regimen  8. Exercise:walks every day- 30 minutes - couple of times a day- boot camp every morning for 30 minutes 6 days a week  9. Encouraged 30 minutes most days of the week. Explained benefits of losing a few pounds to decrease estrogen exposure from fat cells.  10. Discussed risks vs benefits of genetic testing.  Indications for testing: due to her family history of breast cancer. Explained process of drawing blood- filing with insurance- if denied pt will be notified and given the option of paying out of pocket.   11. Explained 3 possible answers to genetic testing.   - Negative - if testing is negative would proceed with MRI and Mammogram screenings alternating every 6 months with clinical exams.  - Positive - If positive for a mutation- depending on the gene identified - would likely be recommended to see surgeon and plastic surgeon to discuss risk reducing prophylactic mary mastectomies with reconstruction with or without TAHBSO depending on test results.  Indication for testing of other relatives explained.  - Variant of undetermined significance - usually does not change follow-up recommendations or screenings unless reclassified in the future as a definitive mutation.   Pt will think about genetic testing and will let me know if interested  12. Discussed use of tamoxifen for the reduction of breast cancer risk- Information regarding risk reducing medications given to pt verbally and in writing. Pt taking hrt - not a candidate for Tamoxofen/Arimidex at this time.   13.  BSE technique was demonstrated and explained. Related what to look and feel for on exam monthly. Pt verbalized understanding and return demonstrated proper technique and knowledge of what to look for on self-exam. Pt  instructed to call if notes any changes. Contact information given.   30 minutes was spent with this patient and 75% was spent identifying risk factors, reviewing records and educating pt regarding risk reduction strategies and planning future screenings.              Information about strategies to decrease breast cancer risk factors from Up to Date given to the pt.

## 2020-06-10 ENCOUNTER — LAB VISIT (OUTPATIENT)
Dept: OTOLARYNGOLOGY | Facility: CLINIC | Age: 55
End: 2020-06-10
Payer: COMMERCIAL

## 2020-06-10 DIAGNOSIS — Z12.11 COLON CANCER SCREENING: ICD-10-CM

## 2020-06-10 PROCEDURE — U0003 INFECTIOUS AGENT DETECTION BY NUCLEIC ACID (DNA OR RNA); SEVERE ACUTE RESPIRATORY SYNDROME CORONAVIRUS 2 (SARS-COV-2) (CORONAVIRUS DISEASE [COVID-19]), AMPLIFIED PROBE TECHNIQUE, MAKING USE OF HIGH THROUGHPUT TECHNOLOGIES AS DESCRIBED BY CMS-2020-01-R: HCPCS

## 2020-06-12 ENCOUNTER — ANESTHESIA (OUTPATIENT)
Dept: ENDOSCOPY | Facility: HOSPITAL | Age: 55
End: 2020-06-12
Payer: COMMERCIAL

## 2020-06-12 ENCOUNTER — HOSPITAL ENCOUNTER (OUTPATIENT)
Facility: HOSPITAL | Age: 55
Discharge: HOME | End: 2020-06-12
Attending: COLON & RECTAL SURGERY | Admitting: COLON & RECTAL SURGERY
Payer: COMMERCIAL

## 2020-06-12 ENCOUNTER — ANESTHESIA EVENT (OUTPATIENT)
Dept: ENDOSCOPY | Facility: HOSPITAL | Age: 55
End: 2020-06-12
Payer: COMMERCIAL

## 2020-06-12 VITALS
HEART RATE: 53 BPM | BODY MASS INDEX: 23.21 KG/M2 | WEIGHT: 126.13 LBS | DIASTOLIC BLOOD PRESSURE: 81 MMHG | SYSTOLIC BLOOD PRESSURE: 116 MMHG | OXYGEN SATURATION: 100 % | HEIGHT: 62 IN | TEMPERATURE: 98 F | RESPIRATION RATE: 14 BRPM

## 2020-06-12 DIAGNOSIS — Z12.11 SCREEN FOR COLON CANCER: Primary | ICD-10-CM

## 2020-06-12 LAB — SARS-COV-2 RNA RESP QL NAA+PROBE: NOT DETECTED

## 2020-06-12 PROCEDURE — 25000003 PHARM REV CODE 250: Performed by: NURSE ANESTHETIST, CERTIFIED REGISTERED

## 2020-06-12 PROCEDURE — 37000008 HC ANESTHESIA 1ST 15 MINUTES: Performed by: COLON & RECTAL SURGERY

## 2020-06-12 PROCEDURE — G0121 COLON CA SCRN NOT HI RSK IND: HCPCS | Performed by: COLON & RECTAL SURGERY

## 2020-06-12 PROCEDURE — G0121 COLON CA SCRN NOT HI RSK IND: HCPCS | Mod: ,,, | Performed by: COLON & RECTAL SURGERY

## 2020-06-12 PROCEDURE — 37000009 HC ANESTHESIA EA ADD 15 MINS: Performed by: COLON & RECTAL SURGERY

## 2020-06-12 PROCEDURE — G0121 COLON CA SCRN NOT HI RSK IND: ICD-10-PCS | Mod: ,,, | Performed by: COLON & RECTAL SURGERY

## 2020-06-12 PROCEDURE — 63600175 PHARM REV CODE 636 W HCPCS: Performed by: NURSE ANESTHETIST, CERTIFIED REGISTERED

## 2020-06-12 RX ORDER — SODIUM CHLORIDE, SODIUM LACTATE, POTASSIUM CHLORIDE, CALCIUM CHLORIDE 600; 310; 30; 20 MG/100ML; MG/100ML; MG/100ML; MG/100ML
INJECTION, SOLUTION INTRAVENOUS CONTINUOUS
Status: DISCONTINUED | OUTPATIENT
Start: 2020-06-13 | End: 2020-06-12 | Stop reason: HOSPADM

## 2020-06-12 RX ORDER — LIDOCAINE HYDROCHLORIDE 10 MG/ML
INJECTION, SOLUTION EPIDURAL; INFILTRATION; INTRACAUDAL; PERINEURAL
Status: DISCONTINUED | OUTPATIENT
Start: 2020-06-12 | End: 2020-06-12

## 2020-06-12 RX ORDER — ONDANSETRON 2 MG/ML
INJECTION INTRAMUSCULAR; INTRAVENOUS
Status: DISCONTINUED | OUTPATIENT
Start: 2020-06-12 | End: 2020-06-12

## 2020-06-12 RX ORDER — SODIUM CHLORIDE, SODIUM LACTATE, POTASSIUM CHLORIDE, CALCIUM CHLORIDE 600; 310; 30; 20 MG/100ML; MG/100ML; MG/100ML; MG/100ML
INJECTION, SOLUTION INTRAVENOUS CONTINUOUS PRN
Status: DISCONTINUED | OUTPATIENT
Start: 2020-06-12 | End: 2020-06-12

## 2020-06-12 RX ORDER — PROPOFOL 10 MG/ML
VIAL (ML) INTRAVENOUS
Status: DISCONTINUED | OUTPATIENT
Start: 2020-06-12 | End: 2020-06-12

## 2020-06-12 RX ADMIN — PROPOFOL 30 MG: 10 INJECTION, EMULSION INTRAVENOUS at 08:06

## 2020-06-12 RX ADMIN — PROPOFOL 20 MG: 10 INJECTION, EMULSION INTRAVENOUS at 08:06

## 2020-06-12 RX ADMIN — PROPOFOL 30 MG: 10 INJECTION, EMULSION INTRAVENOUS at 07:06

## 2020-06-12 RX ADMIN — SODIUM CHLORIDE, SODIUM LACTATE, POTASSIUM CHLORIDE, AND CALCIUM CHLORIDE: .6; .31; .03; .02 INJECTION, SOLUTION INTRAVENOUS at 07:06

## 2020-06-12 RX ADMIN — PROPOFOL 100 MG: 10 INJECTION, EMULSION INTRAVENOUS at 07:06

## 2020-06-12 RX ADMIN — ONDANSETRON 4 MG: 2 INJECTION, SOLUTION INTRAMUSCULAR; INTRAVENOUS at 07:06

## 2020-06-12 RX ADMIN — LIDOCAINE HYDROCHLORIDE 50 MG: 10 INJECTION, SOLUTION EPIDURAL; INFILTRATION; INTRACAUDAL; PERINEURAL at 07:06

## 2020-06-12 NOTE — [employer] PATIENT INSTRUCTIONS
Discharge Summary/Instructions after an Endoscopic Procedure  Patient Name: Jessica Walker  Patient MRN: [id number]  Patient YOB: 1965 Friday, June 12, 2020 Oneil Godinez MD  RESTRICTIONS:  During your procedure today, you received medications for sedation.  These   medications may affect your judgment, balance and coordination.  Therefore,   for 24 hours, you have the following restrictions:   - DO NOT drive a car, operate machinery, make legal/financial decisions,   sign important papers or drink alcohol.    ACTIVITY:  Today: no heavy lifting, straining or running due to procedural   sedation/anesthesia.  The following day: return to full activity including work.  DIET:  Eat and drink normally unless instructed otherwise.     TREATMENT FOR COMMON SIDE EFFECTS:  - Mild abdominal pain, nausea, belching, bloating or excessive gas:  rest,   eat lightly and use a heating pad.  - Sore Throat: treat with throat lozenges and/or gargle with warm salt   water.  - Because air was used during the procedure, expelling large amounts of air   from your rectum or belching is normal.  - If a bowel prep was taken, you may not have a bowel movement for 1-3 days.    This is normal.  SYMPTOMS TO WATCH FOR AND REPORT TO YOUR PHYSICIAN:  1. Abdominal pain or bloating, other than gas cramps.  2. Chest pain.  3. Back pain.  4. Signs of infection such as: chills or fever occurring within 24 hours   after the procedure.  5. Rectal bleeding, which would show as bright red, maroon, or black stools.   (A tablespoon of blood from the rectum is not serious, especially if   hemorrhoids are present.)  6. Vomiting.  7. Weakness or dizziness.  GO DIRECTLY TO THE NEAREST EMERGENCY ROOM IF YOU HAVE ANY OF THE FOLLOWING:      Difficulty breathing              Chills and/or fever over 101 F   Persistent vomiting and/or vomiting blood   Severe abdominal pain   Severe chest pain   Black, tarry stools   Bleeding- more than one  tablespoon   Any other symptom or condition that you feel may need urgent attention  Your doctor recommends these additional instructions:  If any biopsies were taken, your doctors clinic will contact you in 1 to 2   weeks with any results.  - Discharge patient to home.   - High fiber diet.   - Continue present medications.   - Repeat colonoscopy in 10 years for screening purposes.   - Return to primary care physician PRN.  For questions, problems or results please call your physician Oneil Godinez MD at Work:  [phone]  If you have any questions about the above instructions, call the GI   department at [phone] or call the endoscopy unit at [phone]   from 7am until 3 pm.  [facility] [place], EMERGENCY ROOM PHONE NUMBER:   [phone]  IF A COMPLICATION OR EMERGENCY SITUATION ARISES AND YOU ARE UNABLE TO REACH   YOUR PHYSICIAN - GO DIRECTLY TO THE EMERGENCY ROOM.  I have read or have had read to me these discharge instructions for my   procedure and have received a written copy.  I understand these   instructions and will follow-up with my physician if I have any questions.     __________________________________       _____________________________________  Nurse Signature                                          Patient/Designated   Responsible Party Signature  MD Oneil Durham MD  6/12/2020 8:17:15 AM  This report has been verified and signed electronically.  [employer]

## 2020-06-12 NOTE — BRIEF OP NOTE
[facility] -   Brief Operative Note     SUMMARY     Surgery Date: 6/12/2020     Surgeon(s) and Role:     * Oneil Godinez MD - Primary    Assisting Surgeon: None    Pre-op Diagnosis:  Colon cancer screening [Z12.11]    Post-op Diagnosis:  Post-Op Diagnosis Codes:     * Colon cancer screening [Z12.11]    Procedure(s) (LRB):  COLONOSCOPY (N/A)    Anesthesia: Choice    Description of the findings of the procedure: See Op Note    Findings/Key Components: See Op Note    Estimated Blood Loss: * No values recorded between 6/12/2020 12:00 AM and 6/12/2020  8:17 AM *         Specimens:   Specimen (12h ago, onward)    None          Discharge Note    SUMMARY     Admit Date: 6/12/2020    Discharge Date and Time: 6/12/2020 8:17 AM    Hospital Course Patient was seen in the preoperative area by both myself and anesthesia. All consents were verified and all questions appropriately answered. All risks, benefits and alternatives explained to patient. Patient proceeded to endoscopy suite for colonoscopy and was discharged home postoperative once cleared by anesthesia.    Final Diagnosis: Post-Op Diagnosis Codes:     * Colon cancer screening [Z12.11]    Disposition: Home or Self Care    Follow Up/Patient Instructions: See Provation report    Medications:  Reconciled Home Medications:      Medication List      CONTINUE taking these medications    BLACK COHOSH ORAL  Take by mouth.     estradioL 1 MG tablet  Commonly known as:  ESTRACE  Take 1 tablet (1 mg total) by mouth once daily.     magnesium 30 mg Tab  Take by mouth once.          Discharge Procedure Orders   Diet general     Call MD for:  temperature >100.4     Call MD for:  persistent nausea and vomiting     Call MD for:  severe uncontrolled pain     Call MD for:  difficulty breathing, headache or visual disturbances     Call MD for:  redness, tenderness, or signs of infection (pain, swelling, redness, odor or green/yellow discharge around incision site)      Call MD for:  hives     Call MD for:  persistent dizziness or light-headedness     Call MD for:  extreme fatigue     Activity as tolerated     Follow-up Information     Radha Garcia MD.    Specialty:  Family Medicine  Why:  As needed  Contact information:  [id number] [address] [address] [place] [id number] [phone]

## 2020-06-12 NOTE — TRANSFER OF CARE
"Anesthesia Transfer of Care Note    Patient: Jessica Walker    Procedure(s) Performed: Procedure(s) (LRB):  COLONOSCOPY (N/A)    Patient location: PACU    Anesthesia Type: MAC    Transport from OR: Transported from OR on room air with adequate spontaneous ventilation    Post pain: adequate analgesia    Post assessment: no apparent anesthetic complications and tolerated procedure well    Post vital signs: stable    Level of consciousness: awake    Nausea/Vomiting: no nausea/vomiting    Complications: none    Transfer of care protocol was followed      Last vitals:   Visit Vitals  /72 (BP Location: Left arm, Patient Position: Lying)   Pulse 65   Temp 36.6 °C (97.9 °F) (Temporal)   Resp 20   Ht 5' 2" (1.575 m)   Wt 57.2 kg (126 lb 1.7 oz)   SpO2 98%   Breastfeeding? No   BMI 23.06 kg/m²     "

## 2020-06-12 NOTE — H&P
[facility]  Colon and Rectal Surgery  History & Physical    Patient Name: Jessica Walker  MRN: [id number]  Admission Date: 6/12/2020  Attending Physician: Oneil Godinez MD  Primary Care Provider: Radha Garcia MD    Patient information was obtained from patient and medical records.    Subjective:     Chief Complaint/Reason for Admission: Here for Colonoscopy    History of Present Illness:  Patient is a 55 y.o. female presents for colonoscopy. Last colonoscopy >10yrs ago and normal without polyps or masses. No hematochezia, melena or change in bowel habits. No personal or fam hx of CRC, polyps or IBD.    No current facility-administered medications on file prior to encounter.      Current Outpatient Medications on File Prior to Encounter   Medication Sig    magnesium 30 mg Tab Take by mouth once.       Review of patient's allergies indicates:   Allergen Reactions    Penicillins        History reviewed. No pertinent past medical history.  Past Surgical History:   Procedure Laterality Date    HYSTERECTOMY  2009    large fibroid, retains ovaries     INNER EAR SURGERY Bilateral      Family History     Problem Relation (Age of Onset)    Breast cancer Mother    Heart attack Father    Prostate cancer Brother        Tobacco Use    Smoking status: Never Smoker    Smokeless tobacco: Never Used   Substance and Sexual Activity    Alcohol use: Not Currently    Drug use: Never    Sexual activity: Not on file     Comment: hyst     Review of Systems   Constitutional: Negative for activity change, appetite change, chills, fatigue, fever and unexpected weight change.   HENT: Negative for congestion, ear pain, sore throat and trouble swallowing.    Eyes: Negative for pain, redness and itching.   Respiratory: Negative for cough, shortness of breath and wheezing.    Cardiovascular: Negative for chest pain, palpitations and leg swelling.   Gastrointestinal: Negative for abdominal distention, abdominal pain,  anal bleeding, blood in stool, constipation, diarrhea, nausea, rectal pain and vomiting.   Endocrine: Negative for cold intolerance, heat intolerance and polyuria.   Genitourinary: Negative for dysuria, flank pain, frequency and hematuria.   Musculoskeletal: Negative for gait problem, joint swelling and neck pain.   Skin: Negative for color change, rash and wound.   Allergic/Immunologic: Negative for environmental allergies and immunocompromised state.   Neurological: Negative for dizziness, speech difficulty, weakness and numbness.   Psychiatric/Behavioral: Negative for agitation, confusion and hallucinations.     Objective:     Vital Signs (Most Recent):  Temp: 97.9 °F (36.6 °C) (06/12/20 0723)  Pulse: 62 (06/12/20 0723)  Resp: 16 (06/12/20 0723)  BP: 106/68 (06/12/20 0723)  SpO2: 99 % (06/12/20 0723) Vital Signs (24h Range):  Temp:  [97.9 °F (36.6 °C)] 97.9 °F (36.6 °C)  Pulse:  [62] 62  Resp:  [16] 16  SpO2:  [99 %] 99 %  BP: (106)/(68) 106/68     Weight: 57.2 kg (126 lb 1.7 oz)  Body mass index is 23.06 kg/m².    Physical Exam   Constitutional: She is oriented to person, place, and time. She appears well-developed.   HENT:   Head: Normocephalic and atraumatic.   Eyes: Conjunctivae and EOM are normal.   Neck: Normal range of motion. No thyromegaly present.   Cardiovascular: Normal rate and regular rhythm.   Pulmonary/Chest: Effort normal. No respiratory distress.   Abdominal: Soft. She exhibits no distension and no mass. There is no tenderness.   Musculoskeletal: Normal range of motion. She exhibits no edema or tenderness.   Neurological: She is alert and oriented to person, place, and time.   Skin: Skin is warm and dry. Capillary refill takes less than 2 seconds. No rash noted.   Psychiatric: She has a normal mood and affect.         Assessment/Plan:     Patient is a 55 y.o. female who presents for colonoscopy     - Ok to proceed to endoscopy suite for colonoscopy  - Consent obtained. All risks, benefits and  alternatives fully explained to patient, including but not limited to bleeding, infection, perforation, and missed polyps. All questions appropriately answered to patient's satisfaction. Consent signed and placed on chart.    There are no hospital problems to display for this patient.    VTE Risk Mitigation (From admission, onward)    None          Oneil Godinez MD  Colon and Rectal Surgery  [facility] -

## 2020-06-12 NOTE — ANESTHESIA POSTPROCEDURE EVALUATION
Anesthesia Post Evaluation    Patient: Jessica Walker    Procedure(s) Performed: Procedure(s) (LRB):  COLONOSCOPY (N/A)    Final Anesthesia Type: MAC    Patient location during evaluation: PACU  Patient participation: Yes- Able to Participate  Level of consciousness: awake and alert  Post-procedure vital signs: reviewed and stable  Pain management: adequate  Airway patency: patent    PONV status at discharge: No PONV  Anesthetic complications: no      Cardiovascular status: blood pressure returned to baseline and hemodynamically stable  Respiratory status: unassisted, spontaneous ventilation and room air  Hydration status: euvolemic  Follow-up not needed.          Vitals Value Taken Time   /72 6/12/2020  8:17 AM   Temp 36.6 °C (97.9 °F) 6/12/2020  7:23 AM   Pulse 65 6/12/2020  8:17 AM   Resp 20 6/12/2020  8:17 AM   SpO2 98 % 6/12/2020  8:17 AM         No case tracking events are documented in the log.      Pain/Gal Score: No data recorded

## 2020-06-12 NOTE — ANESTHESIA PREPROCEDURE EVALUATION
06/12/2020  Jessica Walker is a 55 y.o., female.    Anesthesia Evaluation    I have reviewed the Patient Summary Reports.    I have reviewed the Nursing Notes. I have reviewed the NPO Status.   I have reviewed the Medications.     Review of Systems  Anesthesia Hx:  No problems with previous Anesthesia  Denies Family Hx of Anesthesia complications.   Denies Personal Hx of Anesthesia complications.   Hematology/Oncology:  Hematology Normal   Oncology Normal     EENT/Dental:EENT/Dental Normal   Cardiovascular:  Cardiovascular Normal     Pulmonary:  Pulmonary Normal    Renal/:  Renal/ Normal     Hepatic/GI:  Hepatic/GI Normal    Musculoskeletal:  Musculoskeletal Normal    Neurological:  Neurology Normal    Endocrine:  Endocrine Normal    Dermatological:  Skin Normal    Psych:  Psychiatric Normal           Physical Exam  General:  Well nourished    Airway/Jaw/Neck:  Airway Findings: Mouth Opening: Normal Tongue: Normal  General Airway Assessment: Adult  Mallampati: II  TM Distance: Normal, at least 6 cm  Jaw/Neck Findings:  Neck ROM: Normal ROM      Dental:  Dental Findings: In tact   Chest/Lungs:  Chest/Lungs Clear    Heart/Vascular:  Heart Findings: Normal            Anesthesia Plan  Type of Anesthesia, risks & benefits discussed:  Anesthesia Type:  MAC  Patient's Preference:   Intra-op Monitoring Plan: standard ASA monitors  Intra-op Monitoring Plan Comments:   Post Op Pain Control Plan: multimodal analgesia  Post Op Pain Control Plan Comments:   Induction:   IV  Beta Blocker:  Patient is not currently on a Beta-Blocker (No further documentation required).       Informed Consent: Patient understands risks and agrees with Anesthesia plan.  Questions answered. Anesthesia consent signed with patient.  ASA Score: 1     Day of Surgery Review of History & Physical: I have interviewed and examined the patient. I  have reviewed the patient's H&P dated:  There are no significant changes.          Ready For Surgery From Anesthesia Perspective.

## 2020-06-12 NOTE — DISCHARGE INSTRUCTIONS
Colonoscopy     A camera attached to a flexible tube with a viewing lens is used to take video pictures.     Colonoscopy is a test to view the inside of your lower digestive tract (colon and rectum). Sometimes it can show the last part of the small intestine (ileum). During the test, small pieces of tissue may be removed for testing. This is called a biopsy. Small growths, such as polyps, may also be removed.   Why is colonoscopy done?  The test is done to help look for colon cancer. And it can help find the source of abdominal pain, bleeding, and changes in bowel habits. It may be needed once a year, depending on factors such as your:  · Age  · Health history  · Family health history  · Symptoms  · Results from any prior colonoscopy  Risks and possible complications  These include:  · Bleeding               · A puncture or tear in the colon   · Risks of anesthesia  · A cancer lesion not being seen  Getting ready   To prepare for the test:  · Talk with your healthcare provider about the risks of the test (see below). Also ask your healthcare provider about alternatives to the test.  · Tell your healthcare provider about any medicines you take. Also tell him or her about any health conditions you may have.  · Make sure your rectum and colon are empty for the test. Follow the diet and bowel prep instructions exactly. If you dont, the test may need to be rescheduled.  · Plan for a friend or family member to drive you home after the test.     Colonoscopy provides an inside view of the entire colon.     You may discuss the results with your doctor right away or at a future visit.  During the test   The test is usually done in the hospital on an outpatient basis. This means you go home the same day. The procedure takes about 30 minutes. During that time:  · You are given relaxing (sedating) medicine through an IV line. You may be drowsy, or fully asleep.  · The healthcare provider will first give you a physical exam to  check for anal and rectal problems.  · Then the anus is lubricated and the scope inserted.  · If you are awake, you may have a feeling similar to needing to have a bowel movement. You may also feel pressure as air is pumped into the colon. Its OK to pass gas during the procedure.  · Biopsy, polyp removal, or other treatments may be done during the test.  After the test   You may have gas right after the test. It can help to try to pass it to help prevent later bloating. Your healthcare provider may discuss the results with you right away. Or you may need to schedule a follow-up visit to talk about the results. After the test, you can go back to your normal eating and other activities. You may be tired from the sedation and need to rest for a few hours.  Date Last Reviewed: 11/1/2016 © 2000-2017 [employer]. [address] [place] [address], [place] [address]. All rights reserved. This information is not intended as a substitute for professional medical care. Always follow your healthcare professional's instructions.

## 2020-07-22 DIAGNOSIS — Z12.39 BREAST CANCER SCREENING: Primary | ICD-10-CM

## 2020-07-24 DIAGNOSIS — Z00.00 PREVENTATIVE HEALTH CARE: Primary | ICD-10-CM

## 2020-07-28 ENCOUNTER — OFFICE VISIT (OUTPATIENT)
Dept: DERMATOLOGY | Facility: CLINIC | Age: 55
End: 2020-07-28
Payer: COMMERCIAL

## 2020-07-28 DIAGNOSIS — L50.9 URTICARIA: Primary | ICD-10-CM

## 2020-07-28 PROCEDURE — 99214 OFFICE O/P EST MOD 30 MIN: CPT | Mod: S$GLB,,, | Performed by: STUDENT IN AN ORGANIZED HEALTH CARE EDUCATION/TRAINING PROGRAM

## 2020-07-28 PROCEDURE — 99999 PR PBB SHADOW E&M-EST. PATIENT-LVL III: CPT | Mod: PBBFAC,,, | Performed by: STUDENT IN AN ORGANIZED HEALTH CARE EDUCATION/TRAINING PROGRAM

## 2020-07-28 PROCEDURE — 99999 PR PBB SHADOW E&M-EST. PATIENT-LVL III: ICD-10-PCS | Mod: PBBFAC,,, | Performed by: STUDENT IN AN ORGANIZED HEALTH CARE EDUCATION/TRAINING PROGRAM

## 2020-07-28 PROCEDURE — 99214 PR OFFICE/OUTPT VISIT, EST, LEVL IV, 30-39 MIN: ICD-10-PCS | Mod: S$GLB,,, | Performed by: STUDENT IN AN ORGANIZED HEALTH CARE EDUCATION/TRAINING PROGRAM

## 2020-07-28 RX ORDER — HYDROXYZINE HYDROCHLORIDE 25 MG/1
25 TABLET, FILM COATED ORAL NIGHTLY
Qty: 30 TABLET | Refills: 1 | Status: SHIPPED | OUTPATIENT
Start: 2020-07-28 | End: 2021-02-24

## 2020-07-28 RX ORDER — BETAMETHASONE VALERATE 1.2 MG/G
CREAM TOPICAL 2 TIMES DAILY
Qty: 45 G | Refills: 1 | Status: SHIPPED | OUTPATIENT
Start: 2020-07-28 | End: 2021-02-24

## 2020-07-28 RX ORDER — DOXYCYCLINE HYCLATE 100 MG
100 TABLET ORAL DAILY
Qty: 30 TABLET | Refills: 1 | Status: SHIPPED | OUTPATIENT
Start: 2020-07-28 | End: 2021-02-24

## 2020-07-28 RX ORDER — ESTRADIOL 1 MG/1
1 TABLET ORAL DAILY
COMMUNITY
End: 2020-09-29 | Stop reason: SDUPTHER

## 2020-07-28 NOTE — PROGRESS NOTES
Subjective:       Patient ID:  Jessica Walker is a 55 y.o. female who presents for   Chief Complaint   Patient presents with    Rash     History of Present Illness: The patient presents with chief complaint of a rash.  Location: arms, legs  Duration: approx 1 week  Signs/Symptoms: red, itchy bumps and some swelling. Largely reddish bumps that she admits to scratching. Does go out at dusk time and work in garden.   Prior treatments: none        Review of Systems   Constitutional: Negative for fever and chills.        Objective:    Physical Exam   Constitutional: She appears well-developed and well-nourished. No distress.   Neurological: She is alert and oriented to person, place, and time. She is not disoriented.   Psychiatric: She has a normal mood and affect.   Skin:   Areas Examined (abnormalities noted in diagram):   Head / Face Inspection Performed  Neck Inspection Performed  Chest / Axilla Inspection Performed  RUE Inspected  LUE Inspection Performed  RLE Inspected  LLE Inspection Performed              Diagram Legend     Erythematous scaling macule/papule c/w actinic keratosis       Vascular papule c/w angioma      Pigmented verrucoid papule/plaque c/w seborrheic keratosis      Yellow umbilicated papule c/w sebaceous hyperplasia      Irregularly shaped tan macule c/w lentigo     1-2 mm smooth white papules consistent with Milia      Movable subcutaneous cyst with punctum c/w epidermal inclusion cyst      Subcutaneous movable cyst c/w pilar cyst      Firm pink to brown papule c/w dermatofibroma      Pedunculated fleshy papule(s) c/w skin tag(s)      Evenly pigmented macule c/w junctional nevus     Mildly variegated pigmented, slightly irregular-bordered macule c/w mildly atypical nevus      Flesh colored to evenly pigmented papule c/w intradermal nevus       Pink pearly papule/plaque c/w basal cell carcinoma      Erythematous hyperkeratotic cursted plaque c/w SCC      Surgical scar with no sign of skin  cancer recurrence      Open and closed comedones      Inflammatory papules and pustules      Verrucoid papule consistent consistent with wart     Erythematous eczematous patches and plaques     Dystrophic onycholytic nail with subungual debris c/w onychomycosis     Umbilicated papule    Erythematous-base heme-crusted tan verrucoid plaque consistent with inflamed seborrheic keratosis     Erythematous Silvery Scaling Plaque c/w Psoriasis     See annotation      Assessment / Plan:        Urticaria  -     betamethasone valerate 0.1% (VALISONE) 0.1 % Crea; Apply topically 2 (two) times daily.  Dispense: 45 g; Refill: 1  -     doxycycline (VIBRA-TABS) 100 MG tablet; Take 1 tablet (100 mg total) by mouth once daily.  Dispense: 30 tablet; Refill: 1  -     hydrOXYzine HCL (ATARAX) 25 MG tablet; Take 1 tablet (25 mg total) by mouth every evening.  Dispense: 30 tablet; Refill: 1             Follow up in about 6 weeks (around 9/8/2020).

## 2020-07-29 ENCOUNTER — LAB VISIT (OUTPATIENT)
Dept: LAB | Facility: HOSPITAL | Age: 55
End: 2020-07-29
Attending: FAMILY MEDICINE
Payer: COMMERCIAL

## 2020-07-29 DIAGNOSIS — Z00.00 PREVENTATIVE HEALTH CARE: ICD-10-CM

## 2020-07-29 LAB
ALBUMIN SERPL BCP-MCNC: 4.2 G/DL (ref 3.5–5.2)
ALP SERPL-CCNC: 58 U/L (ref 55–135)
ALT SERPL W/O P-5'-P-CCNC: 27 U/L (ref 10–44)
ANION GAP SERPL CALC-SCNC: 7 MMOL/L (ref 8–16)
AST SERPL-CCNC: 24 U/L (ref 10–40)
BASOPHILS # BLD AUTO: 0.04 K/UL (ref 0–0.2)
BASOPHILS NFR BLD: 1 % (ref 0–1.9)
BILIRUB SERPL-MCNC: 0.5 MG/DL (ref 0.1–1)
BUN SERPL-MCNC: 15 MG/DL (ref 6–20)
CALCIUM SERPL-MCNC: 9.6 MG/DL (ref 8.7–10.5)
CHLORIDE SERPL-SCNC: 103 MMOL/L (ref 95–110)
CO2 SERPL-SCNC: 28 MMOL/L (ref 23–29)
CREAT SERPL-MCNC: 0.8 MG/DL (ref 0.5–1.4)
DIFFERENTIAL METHOD: ABNORMAL
EOSINOPHIL # BLD AUTO: 0.1 K/UL (ref 0–0.5)
EOSINOPHIL NFR BLD: 3.4 % (ref 0–8)
ERYTHROCYTE [DISTWIDTH] IN BLOOD BY AUTOMATED COUNT: 12.3 % (ref 11.5–14.5)
EST. GFR  (AFRICAN AMERICAN): >60 ML/MIN/1.73 M^2
EST. GFR  (NON AFRICAN AMERICAN): >60 ML/MIN/1.73 M^2
ESTIMATED AVG GLUCOSE: 94 MG/DL (ref 68–131)
GLUCOSE SERPL-MCNC: 92 MG/DL (ref 70–110)
HBA1C MFR BLD HPLC: 4.9 % (ref 4–5.6)
HCT VFR BLD AUTO: 41.3 % (ref 37–48.5)
HGB BLD-MCNC: 14.1 G/DL (ref 12–16)
IMM GRANULOCYTES # BLD AUTO: 0.01 K/UL (ref 0–0.04)
IMM GRANULOCYTES NFR BLD AUTO: 0.2 % (ref 0–0.5)
LYMPHOCYTES # BLD AUTO: 1.4 K/UL (ref 1–4.8)
LYMPHOCYTES NFR BLD: 34.4 % (ref 18–48)
MCH RBC QN AUTO: 33.1 PG (ref 27–31)
MCHC RBC AUTO-ENTMCNC: 34.1 G/DL (ref 32–36)
MCV RBC AUTO: 97 FL (ref 82–98)
MONOCYTES # BLD AUTO: 0.4 K/UL (ref 0.3–1)
MONOCYTES NFR BLD: 9.5 % (ref 4–15)
NEUTROPHILS # BLD AUTO: 2.1 K/UL (ref 1.8–7.7)
NEUTROPHILS NFR BLD: 51.5 % (ref 38–73)
NRBC BLD-RTO: 0 /100 WBC
PLATELET # BLD AUTO: 225 K/UL (ref 150–350)
PMV BLD AUTO: 9.9 FL (ref 9.2–12.9)
POTASSIUM SERPL-SCNC: 4.6 MMOL/L (ref 3.5–5.1)
PROT SERPL-MCNC: 6.9 G/DL (ref 6–8.4)
RBC # BLD AUTO: 4.26 M/UL (ref 4–5.4)
SODIUM SERPL-SCNC: 138 MMOL/L (ref 136–145)
TSH SERPL DL<=0.005 MIU/L-ACNC: 1.55 UIU/ML (ref 0.4–4)
WBC # BLD AUTO: 4.1 K/UL (ref 3.9–12.7)

## 2020-07-29 PROCEDURE — 80053 COMPREHEN METABOLIC PANEL: CPT

## 2020-07-29 PROCEDURE — 85025 COMPLETE CBC W/AUTO DIFF WBC: CPT

## 2020-07-29 PROCEDURE — 84443 ASSAY THYROID STIM HORMONE: CPT

## 2020-07-29 PROCEDURE — 83036 HEMOGLOBIN GLYCOSYLATED A1C: CPT

## 2020-07-29 PROCEDURE — 36415 COLL VENOUS BLD VENIPUNCTURE: CPT

## 2020-08-03 ENCOUNTER — OFFICE VISIT (OUTPATIENT)
Dept: INTERNAL MEDICINE | Facility: CLINIC | Age: 55
End: 2020-08-03
Payer: COMMERCIAL

## 2020-08-03 ENCOUNTER — PATIENT MESSAGE (OUTPATIENT)
Dept: DERMATOLOGY | Facility: CLINIC | Age: 55
End: 2020-08-03

## 2020-08-03 VITALS
HEART RATE: 62 BPM | WEIGHT: 129.19 LBS | TEMPERATURE: 99 F | OXYGEN SATURATION: 98 % | HEIGHT: 62 IN | SYSTOLIC BLOOD PRESSURE: 102 MMHG | DIASTOLIC BLOOD PRESSURE: 70 MMHG | BODY MASS INDEX: 23.77 KG/M2

## 2020-08-03 DIAGNOSIS — L50.9 URTICARIA: Primary | ICD-10-CM

## 2020-08-03 PROCEDURE — 99213 OFFICE O/P EST LOW 20 MIN: CPT | Mod: S$GLB,,, | Performed by: FAMILY MEDICINE

## 2020-08-03 PROCEDURE — 99999 PR PBB SHADOW E&M-EST. PATIENT-LVL IV: ICD-10-PCS | Mod: PBBFAC,,, | Performed by: FAMILY MEDICINE

## 2020-08-03 PROCEDURE — 99213 PR OFFICE/OUTPT VISIT, EST, LEVL III, 20-29 MIN: ICD-10-PCS | Mod: S$GLB,,, | Performed by: FAMILY MEDICINE

## 2020-08-03 PROCEDURE — 99999 PR PBB SHADOW E&M-EST. PATIENT-LVL IV: CPT | Mod: PBBFAC,,, | Performed by: FAMILY MEDICINE

## 2020-08-03 RX ORDER — IBUPROFEN 100 MG/5ML
2000 SUSPENSION, ORAL (FINAL DOSE FORM) ORAL DAILY
COMMUNITY

## 2020-08-03 RX ORDER — MULTIVIT WITH MINERALS/HERBS
1 TABLET ORAL DAILY
COMMUNITY

## 2020-08-03 RX ORDER — DIAZEPAM 2 MG/1
2 TABLET ORAL EVERY 6 HOURS PRN
COMMUNITY

## 2020-08-03 RX ORDER — CHOLECALCIFEROL (VITAMIN D3) 25 MCG
1000 TABLET ORAL DAILY
COMMUNITY

## 2020-08-03 NOTE — PROGRESS NOTES
Subjective:       Patient ID: Jessica Walker is a 55 y.o. female.    Chief Complaint: Establish Care and Rash    Patient with recent onset of urticarial type lesions.  She has seen Derm and been prescribed medications including doxycycline, topical betamethasone, and hydroxyzine.    Notes 2 months ago started plexus probiotics and a collagen supplement 3 months ago.  On estradiol last 2 months.    Reviewed multiple environmental factors and no changes/new items/red flags found. No family members affected - they do have a dog - not scratching.    Rash  This is a new problem. The current episode started 1 to 4 weeks ago. The problem has been gradually worsening since onset. The affected locations include the back, left axilla, left arm, left elbow, left wrist, left hip, left buttock, left upper leg, left leg, left ankle, right axilla, right arm, right elbow, right wrist, right hip, right buttock, right upper leg and right leg. The rash is characterized by itchiness. She was exposed to a new medication. Associated symptoms include fatigue. Pertinent negatives include no anorexia, congestion, cough, diarrhea, eye pain, facial edema, fever, joint pain, nail changes, rhinorrhea, shortness of breath, sore throat or vomiting. Past treatments include anti-itch cream, antibiotics and antihistamine. The treatment provided mild relief. Her past medical history is significant for varicella. There is no history of allergies, asthma or eczema.     Review of Systems   Constitutional: Positive for fatigue. Negative for fever.   HENT: Negative for congestion, rhinorrhea and sore throat.    Eyes: Negative for pain.   Respiratory: Negative for cough and shortness of breath.    Gastrointestinal: Negative for anorexia, diarrhea and vomiting.   Musculoskeletal: Negative for joint pain.   Skin: Positive for rash. Negative for nail changes.       Objective:      Physical Exam  Constitutional:       Appearance: She is well-developed.   HENT:       Head: Normocephalic and atraumatic.   Skin:     General: Skin is warm and dry.      Comments: Multiple discrete papular erythematous lesions densely to extremities, few to trunk. Erythematous patches assoc with scratching. Few lesions to upper back c/w urticaria.   Neurological:      Mental Status: She is alert and oriented to person, place, and time.   Psychiatric:         Behavior: Behavior normal.           Assessment/Plan:     1. Urticaria     zyrtec 5 or 10 mg daily plus benadryl as tolerated at night. See the dermatologist again.  Consider steroid therapy.  Consider stopping all supplements for now.   Discussed reduced dose of estradiol. She will continue at this level for now.  More than 50% of visit was spent in counseling regarding options, recommendations, medication use, side effects, expectations, and precautions.  Total time spent face-to-face was 20 minutes.

## 2020-08-04 ENCOUNTER — PATIENT MESSAGE (OUTPATIENT)
Dept: DERMATOLOGY | Facility: CLINIC | Age: 55
End: 2020-08-04

## 2020-08-04 DIAGNOSIS — L30.9 DERMATITIS: Primary | ICD-10-CM

## 2020-08-04 RX ORDER — LEVOCETIRIZINE DIHYDROCHLORIDE 5 MG/1
5 TABLET, FILM COATED ORAL NIGHTLY
Qty: 30 TABLET | Refills: 1 | Status: SHIPPED | OUTPATIENT
Start: 2020-08-04 | End: 2021-02-24

## 2020-08-05 ENCOUNTER — PATIENT MESSAGE (OUTPATIENT)
Dept: DERMATOLOGY | Facility: CLINIC | Age: 55
End: 2020-08-05

## 2020-08-05 RX ORDER — PREDNISONE 10 MG/1
TABLET ORAL
Qty: 18 TABLET | Refills: 0 | Status: SHIPPED | OUTPATIENT
Start: 2020-08-05 | End: 2021-02-24

## 2020-08-11 ENCOUNTER — PATIENT MESSAGE (OUTPATIENT)
Dept: DERMATOLOGY | Facility: CLINIC | Age: 55
End: 2020-08-11

## 2020-08-11 DIAGNOSIS — L30.9 DERMATITIS: Primary | ICD-10-CM

## 2020-08-11 RX ORDER — PREDNISONE 10 MG/1
10 TABLET ORAL DAILY
Qty: 4 TABLET | Refills: 0 | Status: SHIPPED | OUTPATIENT
Start: 2020-08-11 | End: 2020-08-15

## 2020-08-19 ENCOUNTER — PATIENT OUTREACH (OUTPATIENT)
Dept: ADMINISTRATIVE | Facility: OTHER | Age: 55
End: 2020-08-19

## 2020-08-20 ENCOUNTER — OFFICE VISIT (OUTPATIENT)
Dept: DERMATOLOGY | Facility: CLINIC | Age: 55
End: 2020-08-20
Payer: COMMERCIAL

## 2020-08-20 DIAGNOSIS — L30.9 DERMATITIS: Primary | ICD-10-CM

## 2020-08-20 PROCEDURE — 88305 TISSUE EXAM BY PATHOLOGIST: ICD-10-PCS | Mod: 26,,, | Performed by: PATHOLOGY

## 2020-08-20 PROCEDURE — 88305 TISSUE EXAM BY PATHOLOGIST: CPT | Mod: 26,,, | Performed by: PATHOLOGY

## 2020-08-20 PROCEDURE — 99213 OFFICE O/P EST LOW 20 MIN: CPT | Mod: 25,S$GLB,, | Performed by: STUDENT IN AN ORGANIZED HEALTH CARE EDUCATION/TRAINING PROGRAM

## 2020-08-20 PROCEDURE — 99213 PR OFFICE/OUTPT VISIT, EST, LEVL III, 20-29 MIN: ICD-10-PCS | Mod: 25,S$GLB,, | Performed by: STUDENT IN AN ORGANIZED HEALTH CARE EDUCATION/TRAINING PROGRAM

## 2020-08-20 PROCEDURE — 99999 PR PBB SHADOW E&M-EST. PATIENT-LVL III: ICD-10-PCS | Mod: PBBFAC,,, | Performed by: STUDENT IN AN ORGANIZED HEALTH CARE EDUCATION/TRAINING PROGRAM

## 2020-08-20 PROCEDURE — 99999 PR PBB SHADOW E&M-EST. PATIENT-LVL III: CPT | Mod: PBBFAC,,, | Performed by: STUDENT IN AN ORGANIZED HEALTH CARE EDUCATION/TRAINING PROGRAM

## 2020-08-20 PROCEDURE — 88305 TISSUE EXAM BY PATHOLOGIST: CPT | Performed by: PATHOLOGY

## 2020-08-20 NOTE — PROGRESS NOTES
Subjective:       Patient ID:  Jessica Walker is a 55 y.o. female who presents for   Chief Complaint   Patient presents with    Rash     History of Present Illness: The patient presents for follow up of a rash on the arms, legs, back. She was last seen on 7/28/20 where she was started on prednisone and antihistamines. Reports no improvement in symptoms, and in fact, worsening of symptoms. Now with red, itchy rash on these areas.         Review of Systems   Constitutional: Negative for fever and chills.        Objective:    Physical Exam   Constitutional: She appears well-developed and well-nourished. No distress.   Neurological: She is alert and oriented to person, place, and time. She is not disoriented.   Psychiatric: She has a normal mood and affect.   Skin:   Areas Examined (abnormalities noted in diagram):   Head / Face Inspection Performed  Neck Inspection Performed  Chest / Axilla Inspection Performed  RUE Inspected  LUE Inspection Performed  RLE Inspected  LLE Inspection Performed              Diagram Legend     Erythematous scaling macule/papule c/w actinic keratosis       Vascular papule c/w angioma      Pigmented verrucoid papule/plaque c/w seborrheic keratosis      Yellow umbilicated papule c/w sebaceous hyperplasia      Irregularly shaped tan macule c/w lentigo     1-2 mm smooth white papules consistent with Milia      Movable subcutaneous cyst with punctum c/w epidermal inclusion cyst      Subcutaneous movable cyst c/w pilar cyst      Firm pink to brown papule c/w dermatofibroma      Pedunculated fleshy papule(s) c/w skin tag(s)      Evenly pigmented macule c/w junctional nevus     Mildly variegated pigmented, slightly irregular-bordered macule c/w mildly atypical nevus      Flesh colored to evenly pigmented papule c/w intradermal nevus       Pink pearly papule/plaque c/w basal cell carcinoma      Erythematous hyperkeratotic cursted plaque c/w SCC      Surgical scar with no sign of skin cancer  recurrence      Open and closed comedones      Inflammatory papules and pustules      Verrucoid papule consistent consistent with wart     Erythematous eczematous patches and plaques     Dystrophic onycholytic nail with subungual debris c/w onychomycosis     Umbilicated papule    Erythematous-base heme-crusted tan verrucoid plaque consistent with inflamed seborrheic keratosis     Erythematous Silvery Scaling Plaque c/w Psoriasis     See annotation      Assessment / Plan:      Pathology Orders:     Normal Orders This Visit    Specimen to Pathology, Dermatology     Questions:    Procedure Type: Dermatology and skin neoplasms    Number of Specimens: 1    ------------------------: -------------------------    Spec 1 Procedure: Biopsy    Spec 1 Clinical Impression: r/o urticaria vs GA vs other    Spec 1 Source: right leg    Clinical Information: chronic erythematous papules and plaques that are pruritic.        Dermatitis  -     Specimen to Pathology, Dermatology    Punch biopsy procedure note:  Punch biopsy performed after verbal consent obtained. Area marked and prepped with alcohol. Approximately 1cc of 1% lidocaine with epinephrine injected. 4 mm disposable punch used to remove lesion. Hemostasis obtained and biopsy site closed with 1 - 2 Prolene sutures. Wound care instructions reviewed with patient and handout given.    Further management pending biopsy results.            Follow up in about 6 weeks (around 10/1/2020).

## 2020-08-24 LAB
FINAL PATHOLOGIC DIAGNOSIS: NORMAL
GROSS: NORMAL

## 2020-09-01 ENCOUNTER — TELEPHONE (OUTPATIENT)
Dept: DERMATOLOGY | Facility: CLINIC | Age: 55
End: 2020-09-01

## 2020-09-03 ENCOUNTER — OFFICE VISIT (OUTPATIENT)
Dept: ALLERGY | Facility: CLINIC | Age: 55
End: 2020-09-03
Payer: COMMERCIAL

## 2020-09-03 ENCOUNTER — LAB VISIT (OUTPATIENT)
Dept: LAB | Facility: HOSPITAL | Age: 55
End: 2020-09-03
Attending: ALLERGY & IMMUNOLOGY
Payer: COMMERCIAL

## 2020-09-03 ENCOUNTER — PATIENT OUTREACH (OUTPATIENT)
Dept: ADMINISTRATIVE | Facility: OTHER | Age: 55
End: 2020-09-03

## 2020-09-03 ENCOUNTER — CLINICAL SUPPORT (OUTPATIENT)
Dept: DERMATOLOGY | Facility: CLINIC | Age: 55
End: 2020-09-03
Payer: COMMERCIAL

## 2020-09-03 VITALS
WEIGHT: 130.06 LBS | TEMPERATURE: 98 F | BODY MASS INDEX: 23.93 KG/M2 | HEIGHT: 62 IN | HEART RATE: 64 BPM | DIASTOLIC BLOOD PRESSURE: 68 MMHG | SYSTOLIC BLOOD PRESSURE: 101 MMHG

## 2020-09-03 DIAGNOSIS — Z48.02 VISIT FOR SUTURE REMOVAL: Primary | ICD-10-CM

## 2020-09-03 DIAGNOSIS — L50.9 URTICARIA: ICD-10-CM

## 2020-09-03 DIAGNOSIS — L30.9 DERMATITIS: Primary | ICD-10-CM

## 2020-09-03 DIAGNOSIS — Z88.0 PENICILLIN ALLERGY: ICD-10-CM

## 2020-09-03 LAB
ALBUMIN SERPL BCP-MCNC: 4.7 G/DL (ref 3.5–5.2)
ALP SERPL-CCNC: 71 U/L (ref 55–135)
ALT SERPL W/O P-5'-P-CCNC: 25 U/L (ref 10–44)
ANION GAP SERPL CALC-SCNC: 8 MMOL/L (ref 8–16)
AST SERPL-CCNC: 22 U/L (ref 10–40)
BILIRUB SERPL-MCNC: 0.4 MG/DL (ref 0.1–1)
BUN SERPL-MCNC: 17 MG/DL (ref 6–20)
CALCIUM SERPL-MCNC: 9.8 MG/DL (ref 8.7–10.5)
CHLORIDE SERPL-SCNC: 106 MMOL/L (ref 95–110)
CO2 SERPL-SCNC: 26 MMOL/L (ref 23–29)
CREAT SERPL-MCNC: 0.7 MG/DL (ref 0.5–1.4)
EST. GFR  (AFRICAN AMERICAN): >60 ML/MIN/1.73 M^2
EST. GFR  (NON AFRICAN AMERICAN): >60 ML/MIN/1.73 M^2
GLUCOSE SERPL-MCNC: 74 MG/DL (ref 70–110)
POTASSIUM SERPL-SCNC: 3.9 MMOL/L (ref 3.5–5.1)
PROT SERPL-MCNC: 7.5 G/DL (ref 6–8.4)
SODIUM SERPL-SCNC: 140 MMOL/L (ref 136–145)

## 2020-09-03 PROCEDURE — 84165 PROTEIN E-PHORESIS SERUM: CPT | Mod: 26,,, | Performed by: PATHOLOGY

## 2020-09-03 PROCEDURE — 84165 PATHOLOGIST INTERPRETATION SPE: ICD-10-PCS | Mod: 26,,, | Performed by: PATHOLOGY

## 2020-09-03 PROCEDURE — 84165 PROTEIN E-PHORESIS SERUM: CPT

## 2020-09-03 PROCEDURE — 86039 ANTINUCLEAR ANTIBODIES (ANA): CPT

## 2020-09-03 PROCEDURE — 86235 NUCLEAR ANTIGEN ANTIBODY: CPT | Mod: 59

## 2020-09-03 PROCEDURE — 85025 COMPLETE CBC W/AUTO DIFF WBC: CPT

## 2020-09-03 PROCEDURE — 99999 PR PBB SHADOW E&M-EST. PATIENT-LVL IV: ICD-10-PCS | Mod: PBBFAC,,, | Performed by: ALLERGY & IMMUNOLOGY

## 2020-09-03 PROCEDURE — 84443 ASSAY THYROID STIM HORMONE: CPT

## 2020-09-03 PROCEDURE — 36415 COLL VENOUS BLD VENIPUNCTURE: CPT

## 2020-09-03 PROCEDURE — 99244 PR OFFICE CONSULTATION,LEVEL IV: ICD-10-PCS | Mod: 25,S$GLB,, | Performed by: ALLERGY & IMMUNOLOGY

## 2020-09-03 PROCEDURE — 99999 PR PBB SHADOW E&M-EST. PATIENT-LVL III: ICD-10-PCS | Mod: PBBFAC,,,

## 2020-09-03 PROCEDURE — 99999 PR PBB SHADOW E&M-EST. PATIENT-LVL III: CPT | Mod: PBBFAC,,,

## 2020-09-03 PROCEDURE — 96372 THER/PROPH/DIAG INJ SC/IM: CPT | Mod: S$GLB,,, | Performed by: ALLERGY & IMMUNOLOGY

## 2020-09-03 PROCEDURE — 86038 ANTINUCLEAR ANTIBODIES: CPT

## 2020-09-03 PROCEDURE — 80053 COMPREHEN METABOLIC PANEL: CPT

## 2020-09-03 PROCEDURE — 99999 PR PBB SHADOW E&M-EST. PATIENT-LVL IV: CPT | Mod: PBBFAC,,, | Performed by: ALLERGY & IMMUNOLOGY

## 2020-09-03 PROCEDURE — 86592 SYPHILIS TEST NON-TREP QUAL: CPT

## 2020-09-03 PROCEDURE — 86003 ALLG SPEC IGE CRUDE XTRC EA: CPT

## 2020-09-03 PROCEDURE — 86803 HEPATITIS C AB TEST: CPT

## 2020-09-03 PROCEDURE — 96372 PR INJECTION,THERAP/PROPH/DIAG2ST, IM OR SUBCUT: ICD-10-PCS | Mod: S$GLB,,, | Performed by: ALLERGY & IMMUNOLOGY

## 2020-09-03 PROCEDURE — 86705 HEP B CORE ANTIBODY IGM: CPT

## 2020-09-03 PROCEDURE — 86703 HIV-1/HIV-2 1 RESULT ANTBDY: CPT

## 2020-09-03 PROCEDURE — 99244 OFF/OP CNSLTJ NEW/EST MOD 40: CPT | Mod: 25,S$GLB,, | Performed by: ALLERGY & IMMUNOLOGY

## 2020-09-03 RX ORDER — CETIRIZINE HYDROCHLORIDE 10 MG/1
10 TABLET ORAL DAILY
Qty: 30 TABLET | Refills: 5 | Status: SHIPPED | OUTPATIENT
Start: 2020-09-03 | End: 2021-02-24

## 2020-09-03 RX ORDER — METHYLPREDNISOLONE ACETATE 40 MG/ML
80 INJECTION, SUSPENSION INTRA-ARTICULAR; INTRALESIONAL; INTRAMUSCULAR; SOFT TISSUE
Status: COMPLETED | OUTPATIENT
Start: 2020-09-03 | End: 2020-09-03

## 2020-09-03 RX ORDER — FAMOTIDINE 40 MG/1
40 TABLET, FILM COATED ORAL DAILY
Qty: 30 TABLET | Refills: 11 | Status: SHIPPED | OUTPATIENT
Start: 2020-09-03 | End: 2021-02-24

## 2020-09-03 RX ORDER — MULTIVITAMIN
1 TABLET ORAL DAILY
COMMUNITY

## 2020-09-03 RX ADMIN — METHYLPREDNISOLONE ACETATE 80 MG: 40 INJECTION, SUSPENSION INTRA-ARTICULAR; INTRALESIONAL; INTRAMUSCULAR; SOFT TISSUE at 11:09

## 2020-09-03 NOTE — PROGRESS NOTES
"Subjective:       Patient ID: Jessica Walker is a 55 y.o. female.    Initial Visit      Chief Complaint:  Rash      HPI: 55 year old female complaining of a rash on the legs, arms and chest for one month. It is erythematous and pruritic. She states that it is hives. Lesions lasts less than 24 hours. She has been taking 2 Benadryl at night, but she still itches.   Oral steroids help for the first few days, then symptoms returned. She is also using steroid cream. Sarna helps itchy "patches". She is unsure, if the lesions scar. No symptoms beyond the skin. No oral pharyngeal swelling.  She can not associate contact, food, pressure, exercise, internal temperature elevation, the sun or water and the hives.  She started Estrogen 2-3 months prior to the onset of symptoms.   She started a Probiotic 2 months prior.      Past Medical History:  See hpi  Menopause    Family History   Problem Relation Age of Onset    Heart attack Father     Breast cancer Mother     Prostate cancer Brother      Current Outpatient Medications on File Prior to Visit   Medication Sig Dispense Refill    ascorbic acid, vitamin C, (VITAMIN C) 1000 MG tablet Take 2,000 mg by mouth once daily.       b complex vitamins tablet Take 1 tablet by mouth once daily.      betamethasone valerate 0.1% (VALISONE) 0.1 % Crea Apply topically 2 (two) times daily. 45 g 1    diazePAM (VALIUM) 2 MG tablet Take 2 mg by mouth every 6 (six) hours as needed (motion sickness).      estradioL (ESTRACE) 1 MG tablet Take 1 mg by mouth once daily.      magnesium 30 mg Tab Take by mouth once.      multivitamin (ONE DAILY MULTIVITAMIN) per tablet Take 1 tablet by mouth once daily.      doxycycline (VIBRA-TABS) 100 MG tablet Take 1 tablet (100 mg total) by mouth once daily. (Patient not taking: Reported on 9/3/2020) 30 tablet 1    hydrOXYzine HCL (ATARAX) 25 MG tablet Take 1 tablet (25 mg total) by mouth every evening. 30 tablet 1    levocetirizine (XYZAL) 5 MG tablet " Take 1 tablet (5 mg total) by mouth every evening. 30 tablet 1    predniSONE (DELTASONE) 10 MG tablet Take 3 tablets for 3 days, then 2 tablets for 3 days, then 1 tablet for 3 days. 18 tablet 0    vitamin D (VITAMIN D3) 1000 units Tab Take 1,000 Units by mouth once daily.       No current facility-administered medications on file prior to visit.      Review of patient's allergies indicates:   Allergen Reactions    Penicillins          Environmental History: Dog Not a smoker  Review of Systems   Constitutional: Negative for chills and fever.   HENT: Negative for congestion and rhinorrhea.    Eyes: Negative for discharge and itching.   Respiratory: Negative for chest tightness, shortness of breath and wheezing.    Cardiovascular: Negative for chest pain and leg swelling.   Gastrointestinal: Negative for nausea and vomiting.   Endocrine: Negative for cold intolerance and heat intolerance.   Musculoskeletal: Negative for gait problem and joint swelling.   Skin: Positive for color change and rash. Negative for pallor.   Allergic/Immunologic: Negative for environmental allergies and food allergies.   Neurological: Negative for facial asymmetry and speech difficulty.   Psychiatric/Behavioral: Negative for behavioral problems and suicidal ideas.        Objective:    Physical Exam  Vitals signs reviewed.   Constitutional:       General: She is not in acute distress.     Appearance: Normal appearance. She is well-developed. She is not ill-appearing, toxic-appearing or diaphoretic.   HENT:      Head: Normocephalic and atraumatic.      Right Ear: Tympanic membrane and external ear normal. There is no impacted cerumen.      Left Ear: External ear normal. There is no impacted cerumen.      Nose: Nose normal. No congestion.      Mouth/Throat:      Pharynx: Oropharynx is clear. No oropharyngeal exudate or posterior oropharyngeal erythema.   Eyes:      General: No scleral icterus.        Right eye: No discharge.         Left eye:  No discharge.      Pupils: Pupils are equal, round, and reactive to light.   Neck:      Musculoskeletal: Normal range of motion and neck supple. No neck rigidity or muscular tenderness.      Thyroid: No thyromegaly.   Cardiovascular:      Rate and Rhythm: Normal rate and regular rhythm.      Heart sounds: Normal heart sounds. No murmur. No friction rub. No gallop.    Pulmonary:      Effort: Pulmonary effort is normal. No respiratory distress.      Breath sounds: Normal breath sounds. No stridor. No wheezing, rhonchi or rales.   Chest:      Chest wall: No tenderness.   Abdominal:      General: Bowel sounds are normal. There is no distension.      Palpations: Abdomen is soft. There is no mass.      Tenderness: There is no abdominal tenderness. There is no guarding.      Hernia: No hernia is present.   Musculoskeletal: Normal range of motion.   Lymphadenopathy:      Cervical: No cervical adenopathy.   Skin:     General: Skin is warm.      Coloration: Skin is not jaundiced or pale.      Findings: Rash present. No bruising or erythema.      Comments: Macular and papular lesions on the legs, arms and chest  No open lesions   Neurological:      Mental Status: She is alert and oriented to person, place, and time.      Gait: Gait normal.   Psychiatric:         Mood and Affect: Mood normal.         Thought Content: Thought content normal.         Judgment: Judgment normal.           Assessment:       1. Dermatitis    2. Penicillin allergy    3. Urticaria         Plan:       Dermatitis  -     Ambulatory referral/consult to Allergy  -     methylPREDNISolone acetate injection 80 mg  -     cetirizine (ZYRTEC) 10 MG tablet; Take 1 tablet (10 mg total) by mouth once daily.  Dispense: 30 tablet; Refill: 5    Penicillin allergy    Urticaria  -     CU (Chronic Urticaria) Index Panel; Future; Expected date: 09/03/2020  -     LASHELL Screen w/Reflex; Future; Expected date: 09/03/2020  -     CBC auto differential; Future; Expected date:  09/03/2020  -     Protein electrophoresis, serum; Future; Expected date: 09/03/2020  -     Stool Exam-Ova,Cysts,Parasites; Future; Expected date: 09/03/2020  -     Ascaris IgE; Future; Expected date: 09/03/2020  -     RPR; Future; Expected date: 09/03/2020  -     Comprehensive metabolic panel; Future; Expected date: 09/03/2020  -     HIV 1/2 Ag/Ab (4th Gen); Future; Expected date: 09/03/2020  -     Hepatitis C Antibody; Future; Expected date: 09/03/2020  -     Hepatitis B core antibody, IgM; Future; Expected date: 09/03/2020  -     methylPREDNISolone acetate injection 80 mg  -     famotidine (PEPCID) 40 MG tablet; Take 1 tablet (40 mg total) by mouth once daily.  Dispense: 30 tablet; Refill: 11  -     cetirizine (ZYRTEC) 10 MG tablet; Take 1 tablet (10 mg total) by mouth once daily.  Dispense: 30 tablet; Refill: 5    If no etiology found with the aforementioned studies, will recommend stopping Estrogen.  Strongly suspect Estrogen as the etiology.  Recommend Pepcid and Cetirizine daily.  Once current rash clears, will schedule for Penicillin Allergy testing, if she is in agreement.    BRYAN MAC

## 2020-09-03 NOTE — LETTER
September 4, 2020      Herrera Roe MD  [address]  [place] [address]           [facility]  [address] [facility] [address]  Phone: [phone]  Fax: [phone]          Patient: Jessica Walker   MR Number: [id number]   YOB: 1965   Date of Visit: 9/3/2020       Dear Dr. Herrera Roe:    Thank you for referring Jessica Walker to me for evaluation. Attached you will find relevant portions of my assessment and plan of care.    If you have questions, please do not hesitate to call me. I look forward to following Jessica Walker along with you.    Sincerely,    Isabella Ochoa MD    Enclosure  CC:  No Recipients    If you would like to receive this communication electronically, please contact [email or web address] or [phone] to request more information on [employer] access.    For providers and/or their staff who would like to refer a patient to [facility], please contact us through our one-stop-shop provider referral line, [facility], at [phone].    If you feel you have received this communication in error or would no longer like to receive these types of communications, please e-mail [email or web address]

## 2020-09-04 ENCOUNTER — LAB VISIT (OUTPATIENT)
Dept: LAB | Facility: HOSPITAL | Age: 55
End: 2020-09-04
Attending: FAMILY MEDICINE
Payer: COMMERCIAL

## 2020-09-04 DIAGNOSIS — L50.9 URTICARIA: ICD-10-CM

## 2020-09-04 LAB
ALBUMIN SERPL ELPH-MCNC: 4.54 G/DL (ref 3.35–5.55)
ALPHA1 GLOB SERPL ELPH-MCNC: 0.29 G/DL (ref 0.17–0.41)
ALPHA2 GLOB SERPL ELPH-MCNC: 0.73 G/DL (ref 0.43–0.99)
B-GLOBULIN SERPL ELPH-MCNC: 0.67 G/DL (ref 0.5–1.1)
BASOPHILS # BLD AUTO: 0.04 K/UL (ref 0–0.2)
BASOPHILS NFR BLD: 1 % (ref 0–1.9)
DIFFERENTIAL METHOD: ABNORMAL
EOSINOPHIL # BLD AUTO: 0.2 K/UL (ref 0–0.5)
EOSINOPHIL NFR BLD: 4.8 % (ref 0–8)
ERYTHROCYTE [DISTWIDTH] IN BLOOD BY AUTOMATED COUNT: 12.5 % (ref 11.5–14.5)
GAMMA GLOB SERPL ELPH-MCNC: 0.77 G/DL (ref 0.67–1.58)
HBV CORE IGM SERPL QL IA: NEGATIVE
HCT VFR BLD AUTO: 44.9 % (ref 37–48.5)
HCV AB SERPL QL IA: NEGATIVE
HGB BLD-MCNC: 14.6 G/DL (ref 12–16)
HIV 1+2 AB+HIV1 P24 AG SERPL QL IA: NEGATIVE
IMM GRANULOCYTES # BLD AUTO: 0.01 K/UL (ref 0–0.04)
IMM GRANULOCYTES NFR BLD AUTO: 0.2 % (ref 0–0.5)
LYMPHOCYTES # BLD AUTO: 1.4 K/UL (ref 1–4.8)
LYMPHOCYTES NFR BLD: 33.4 % (ref 18–48)
MCH RBC QN AUTO: 32.9 PG (ref 27–31)
MCHC RBC AUTO-ENTMCNC: 32.5 G/DL (ref 32–36)
MCV RBC AUTO: 101 FL (ref 82–98)
MONOCYTES # BLD AUTO: 0.4 K/UL (ref 0.3–1)
MONOCYTES NFR BLD: 9.4 % (ref 4–15)
NEUTROPHILS # BLD AUTO: 2.1 K/UL (ref 1.8–7.7)
NEUTROPHILS NFR BLD: 51.2 % (ref 38–73)
NRBC BLD-RTO: 0 /100 WBC
PLATELET # BLD AUTO: 230 K/UL (ref 150–350)
PMV BLD AUTO: 11.2 FL (ref 9.2–12.9)
PROT SERPL-MCNC: 7 G/DL (ref 6–8.4)
RBC # BLD AUTO: 4.44 M/UL (ref 4–5.4)
RPR SER QL: NORMAL
WBC # BLD AUTO: 4.16 K/UL (ref 3.9–12.7)

## 2020-09-04 PROCEDURE — 87209 SMEAR COMPLEX STAIN: CPT

## 2020-09-05 LAB
ANA PATTERN 1: NORMAL
ANA SER QL IF: POSITIVE
ANA TITR SER IF: NORMAL {TITER}

## 2020-09-08 LAB
O+P STL MICRO: NORMAL
PATHOLOGIST INTERPRETATION SPE: NORMAL

## 2020-09-10 LAB
A LUMBRIC IGE QN: <0.1 KU/L
ANTI SM ANTIBODY: 0.08 RATIO (ref 0–0.99)
ANTI SM/RNP ANTIBODY: 0.11 RATIO (ref 0–0.99)
ANTI-SM INTERPRETATION: NEGATIVE
ANTI-SM/RNP INTERPRETATION: NEGATIVE
ANTI-SSA ANTIBODY: 0.08 RATIO (ref 0–0.99)
ANTI-SSA INTERPRETATION: NEGATIVE
ANTI-SSB ANTIBODY: 0.11 RATIO (ref 0–0.99)
ANTI-SSB INTERPRETATION: NEGATIVE
DEPRECATED A LUMBRIC IGE RAST QL: 0
DSDNA AB SER-ACNC: NORMAL [IU]/ML

## 2020-09-11 LAB
CU INDEX: <1.4
CU, ANTI-THYROGLOBULIN IGG: <20 IU/ML
CU, ANTI-THYROID PEROXIDASE IGG: <10 IU/ML
CU, TSH (THYROTROPIN): 3.42 UIU/ML (ref 0.4–4)

## 2020-09-16 ENCOUNTER — PATIENT MESSAGE (OUTPATIENT)
Dept: ALLERGY | Facility: CLINIC | Age: 55
End: 2020-09-16

## 2020-09-24 ENCOUNTER — HOSPITAL ENCOUNTER (OUTPATIENT)
Dept: RADIOLOGY | Facility: HOSPITAL | Age: 55
Discharge: HOME | End: 2020-09-24
Attending: NURSE PRACTITIONER
Payer: COMMERCIAL

## 2020-09-24 VITALS — HEIGHT: 62 IN | WEIGHT: 130.06 LBS | BODY MASS INDEX: 23.93 KG/M2

## 2020-09-24 DIAGNOSIS — Z12.39 BREAST CANCER SCREENING: ICD-10-CM

## 2020-09-24 PROCEDURE — 77067 SCR MAMMO BI INCL CAD: CPT | Mod: TC

## 2020-09-24 PROCEDURE — 77063 MAMMO DIGITAL SCREENING BILAT WITH TOMO: ICD-10-PCS | Mod: 26,,, | Performed by: RADIOLOGY

## 2020-09-24 PROCEDURE — 77067 MAMMO DIGITAL SCREENING BILAT WITH TOMO: ICD-10-PCS | Mod: 26,,, | Performed by: RADIOLOGY

## 2020-09-24 PROCEDURE — 77067 SCR MAMMO BI INCL CAD: CPT | Mod: 26,,, | Performed by: RADIOLOGY

## 2020-09-24 PROCEDURE — 77063 BREAST TOMOSYNTHESIS BI: CPT | Mod: 26,,, | Performed by: RADIOLOGY

## 2020-09-29 ENCOUNTER — PATIENT MESSAGE (OUTPATIENT)
Dept: OBSTETRICS AND GYNECOLOGY | Facility: CLINIC | Age: 55
End: 2020-09-29

## 2020-09-29 RX ORDER — ESTRADIOL 1 MG/1
1 TABLET ORAL DAILY
Qty: 30 TABLET | Refills: 6 | Status: SHIPPED | OUTPATIENT
Start: 2020-09-29 | End: 2021-05-20 | Stop reason: SDUPTHER

## 2021-02-17 ENCOUNTER — TELEPHONE (OUTPATIENT)
Dept: HEMATOLOGY/ONCOLOGY | Facility: CLINIC | Age: 56
End: 2021-02-17

## 2021-02-18 ENCOUNTER — PATIENT MESSAGE (OUTPATIENT)
Dept: OTOLARYNGOLOGY | Facility: CLINIC | Age: 56
End: 2021-02-18

## 2021-02-18 ENCOUNTER — OFFICE VISIT (OUTPATIENT)
Dept: OTOLARYNGOLOGY | Facility: CLINIC | Age: 56
End: 2021-02-18
Payer: COMMERCIAL

## 2021-02-18 ENCOUNTER — PATIENT OUTREACH (OUTPATIENT)
Dept: ADMINISTRATIVE | Facility: OTHER | Age: 56
End: 2021-02-18

## 2021-02-18 VITALS
HEART RATE: 64 BPM | SYSTOLIC BLOOD PRESSURE: 106 MMHG | DIASTOLIC BLOOD PRESSURE: 71 MMHG | WEIGHT: 132.69 LBS | BODY MASS INDEX: 24.27 KG/M2 | TEMPERATURE: 98 F

## 2021-02-18 DIAGNOSIS — H93.13 TINNITUS OF BOTH EARS: ICD-10-CM

## 2021-02-18 DIAGNOSIS — R42 DIZZINESS: Primary | ICD-10-CM

## 2021-02-18 PROCEDURE — 99204 OFFICE O/P NEW MOD 45 MIN: CPT | Mod: S$GLB,,, | Performed by: PHYSICIAN ASSISTANT

## 2021-02-18 PROCEDURE — 99204 PR OFFICE/OUTPT VISIT, NEW, LEVL IV, 45-59 MIN: ICD-10-PCS | Mod: S$GLB,,, | Performed by: PHYSICIAN ASSISTANT

## 2021-02-18 PROCEDURE — 99999 PR PBB SHADOW E&M-EST. PATIENT-LVL IV: ICD-10-PCS | Mod: PBBFAC,,, | Performed by: PHYSICIAN ASSISTANT

## 2021-02-18 PROCEDURE — 99999 PR PBB SHADOW E&M-EST. PATIENT-LVL IV: CPT | Mod: PBBFAC,,, | Performed by: PHYSICIAN ASSISTANT

## 2021-02-19 ENCOUNTER — PATIENT MESSAGE (OUTPATIENT)
Dept: OTOLARYNGOLOGY | Facility: CLINIC | Age: 56
End: 2021-02-19

## 2021-02-22 ENCOUNTER — PATIENT MESSAGE (OUTPATIENT)
Dept: AUDIOLOGY | Facility: CLINIC | Age: 56
End: 2021-02-22

## 2021-02-24 ENCOUNTER — OFFICE VISIT (OUTPATIENT)
Dept: INTERNAL MEDICINE | Facility: CLINIC | Age: 56
End: 2021-02-24
Payer: COMMERCIAL

## 2021-02-24 VITALS
SYSTOLIC BLOOD PRESSURE: 112 MMHG | WEIGHT: 134.69 LBS | DIASTOLIC BLOOD PRESSURE: 72 MMHG | HEIGHT: 62 IN | OXYGEN SATURATION: 99 % | HEART RATE: 61 BPM | TEMPERATURE: 98 F | BODY MASS INDEX: 24.78 KG/M2

## 2021-02-24 DIAGNOSIS — Z23 IMMUNIZATION DUE: ICD-10-CM

## 2021-02-24 DIAGNOSIS — Z00.00 ANNUAL PHYSICAL EXAM: Primary | ICD-10-CM

## 2021-02-24 PROCEDURE — 90750 HZV VACC RECOMBINANT IM: CPT | Mod: S$GLB,,, | Performed by: INTERNAL MEDICINE

## 2021-02-24 PROCEDURE — 90715 TDAP VACCINE 7 YRS/> IM: CPT | Mod: S$GLB,,, | Performed by: INTERNAL MEDICINE

## 2021-02-24 PROCEDURE — 90471 TDAP VACCINE GREATER THAN OR EQUAL TO 7YO IM: ICD-10-PCS | Mod: S$GLB,,, | Performed by: INTERNAL MEDICINE

## 2021-02-24 PROCEDURE — 99999 PR PBB SHADOW E&M-EST. PATIENT-LVL IV: CPT | Mod: PBBFAC,,, | Performed by: INTERNAL MEDICINE

## 2021-02-24 PROCEDURE — 90472 IMMUNIZATION ADMIN EACH ADD: CPT | Mod: S$GLB,,, | Performed by: INTERNAL MEDICINE

## 2021-02-24 PROCEDURE — 90472 ZOSTER RECOMBINANT VACCINE: ICD-10-PCS | Mod: S$GLB,,, | Performed by: INTERNAL MEDICINE

## 2021-02-24 PROCEDURE — 90471 IMMUNIZATION ADMIN: CPT | Mod: S$GLB,,, | Performed by: INTERNAL MEDICINE

## 2021-02-24 PROCEDURE — 99999 PR PBB SHADOW E&M-EST. PATIENT-LVL IV: ICD-10-PCS | Mod: PBBFAC,,, | Performed by: INTERNAL MEDICINE

## 2021-02-24 PROCEDURE — 99396 PREV VISIT EST AGE 40-64: CPT | Mod: 25,S$GLB,, | Performed by: INTERNAL MEDICINE

## 2021-02-24 PROCEDURE — 90750 ZOSTER RECOMBINANT VACCINE: ICD-10-PCS | Mod: S$GLB,,, | Performed by: INTERNAL MEDICINE

## 2021-02-24 PROCEDURE — 99396 PR PREVENTIVE VISIT,EST,40-64: ICD-10-PCS | Mod: 25,S$GLB,, | Performed by: INTERNAL MEDICINE

## 2021-02-24 PROCEDURE — 90715 TDAP VACCINE GREATER THAN OR EQUAL TO 7YO IM: ICD-10-PCS | Mod: S$GLB,,, | Performed by: INTERNAL MEDICINE

## 2021-03-10 ENCOUNTER — OFFICE VISIT (OUTPATIENT)
Dept: OBSTETRICS AND GYNECOLOGY | Facility: CLINIC | Age: 56
End: 2021-03-10
Payer: COMMERCIAL

## 2021-03-10 VITALS
BODY MASS INDEX: 24.31 KG/M2 | DIASTOLIC BLOOD PRESSURE: 60 MMHG | SYSTOLIC BLOOD PRESSURE: 102 MMHG | WEIGHT: 132.94 LBS

## 2021-03-10 DIAGNOSIS — Z12.31 ENCOUNTER FOR SCREENING MAMMOGRAM FOR MALIGNANT NEOPLASM OF BREAST: Primary | ICD-10-CM

## 2021-03-10 DIAGNOSIS — Z01.419 ENCOUNTER FOR GYNECOLOGICAL EXAMINATION WITHOUT ABNORMAL FINDING: ICD-10-CM

## 2021-03-10 DIAGNOSIS — Z80.3 FAMILY HISTORY OF BREAST CANCER IN MOTHER: ICD-10-CM

## 2021-03-10 DIAGNOSIS — Z00.00 PREVENTATIVE HEALTH CARE: ICD-10-CM

## 2021-03-10 PROCEDURE — 99999 PR PBB SHADOW E&M-EST. PATIENT-LVL III: ICD-10-PCS | Mod: PBBFAC,,, | Performed by: NURSE PRACTITIONER

## 2021-03-10 PROCEDURE — 99396 PREV VISIT EST AGE 40-64: CPT | Mod: S$GLB,,, | Performed by: NURSE PRACTITIONER

## 2021-03-10 PROCEDURE — 99396 PR PREVENTIVE VISIT,EST,40-64: ICD-10-PCS | Mod: S$GLB,,, | Performed by: NURSE PRACTITIONER

## 2021-03-10 PROCEDURE — 99999 PR PBB SHADOW E&M-EST. PATIENT-LVL III: CPT | Mod: PBBFAC,,, | Performed by: NURSE PRACTITIONER

## 2021-05-05 ENCOUNTER — PATIENT MESSAGE (OUTPATIENT)
Dept: INTERNAL MEDICINE | Facility: CLINIC | Age: 56
End: 2021-05-05

## 2021-05-05 ENCOUNTER — PATIENT MESSAGE (OUTPATIENT)
Dept: OBSTETRICS AND GYNECOLOGY | Facility: CLINIC | Age: 56
End: 2021-05-05

## 2021-05-17 ENCOUNTER — PATIENT MESSAGE (OUTPATIENT)
Dept: INTERNAL MEDICINE | Facility: CLINIC | Age: 56
End: 2021-05-17

## 2021-05-18 ENCOUNTER — CLINICAL SUPPORT (OUTPATIENT)
Dept: INTERNAL MEDICINE | Facility: CLINIC | Age: 56
End: 2021-05-18
Payer: COMMERCIAL

## 2021-05-18 ENCOUNTER — HOSPITAL ENCOUNTER (OUTPATIENT)
Dept: RADIOLOGY | Facility: HOSPITAL | Age: 56
Discharge: HOME | End: 2021-05-18
Attending: NURSE PRACTITIONER
Payer: COMMERCIAL

## 2021-05-18 VITALS — HEIGHT: 62 IN | WEIGHT: 132.94 LBS | BODY MASS INDEX: 24.46 KG/M2

## 2021-05-18 DIAGNOSIS — Z12.31 ENCOUNTER FOR SCREENING MAMMOGRAM FOR MALIGNANT NEOPLASM OF BREAST: ICD-10-CM

## 2021-05-18 PROCEDURE — 77067 SCR MAMMO BI INCL CAD: CPT | Mod: TC

## 2021-05-18 PROCEDURE — 90471 ZOSTER RECOMBINANT VACCINE: ICD-10-PCS | Mod: S$GLB,,, | Performed by: FAMILY MEDICINE

## 2021-05-18 PROCEDURE — 90750 ZOSTER RECOMBINANT VACCINE: ICD-10-PCS | Mod: S$GLB,,, | Performed by: FAMILY MEDICINE

## 2021-05-18 PROCEDURE — 90750 HZV VACC RECOMBINANT IM: CPT | Mod: S$GLB,,, | Performed by: FAMILY MEDICINE

## 2021-05-18 PROCEDURE — 77063 MAMMO DIGITAL SCREENING BILAT WITH TOMO: ICD-10-PCS | Mod: 26,,, | Performed by: RADIOLOGY

## 2021-05-18 PROCEDURE — 90471 IMMUNIZATION ADMIN: CPT | Mod: S$GLB,,, | Performed by: FAMILY MEDICINE

## 2021-05-18 PROCEDURE — 77067 MAMMO DIGITAL SCREENING BILAT WITH TOMO: ICD-10-PCS | Mod: 26,,, | Performed by: RADIOLOGY

## 2021-05-18 PROCEDURE — 77063 BREAST TOMOSYNTHESIS BI: CPT | Mod: 26,,, | Performed by: RADIOLOGY

## 2021-05-18 PROCEDURE — 77067 SCR MAMMO BI INCL CAD: CPT | Mod: 26,,, | Performed by: RADIOLOGY

## 2021-05-19 ENCOUNTER — PATIENT MESSAGE (OUTPATIENT)
Dept: UROLOGY | Facility: CLINIC | Age: 56
End: 2021-05-19

## 2021-05-20 RX ORDER — ESTRADIOL 1 MG/1
1 TABLET ORAL DAILY
Qty: 30 TABLET | Refills: 6 | Status: SHIPPED | OUTPATIENT
Start: 2021-05-20 | End: 2021-06-19

## 2021-08-02 ENCOUNTER — TELEPHONE (OUTPATIENT)
Dept: PALLIATIVE MEDICINE | Facility: CLINIC | Age: 56
End: 2021-08-02
